# Patient Record
Sex: MALE | Race: BLACK OR AFRICAN AMERICAN | NOT HISPANIC OR LATINO | Employment: FULL TIME | URBAN - METROPOLITAN AREA
[De-identification: names, ages, dates, MRNs, and addresses within clinical notes are randomized per-mention and may not be internally consistent; named-entity substitution may affect disease eponyms.]

---

## 2022-02-08 ENCOUNTER — OFFICE VISIT (OUTPATIENT)
Dept: FAMILY MEDICINE CLINIC | Facility: CLINIC | Age: 42
End: 2022-02-08

## 2022-02-08 ENCOUNTER — LAB (OUTPATIENT)
Dept: LAB | Facility: HOSPITAL | Age: 42
End: 2022-02-08

## 2022-02-08 VITALS
WEIGHT: 195 LBS | TEMPERATURE: 97.8 F | OXYGEN SATURATION: 96 % | HEIGHT: 70 IN | DIASTOLIC BLOOD PRESSURE: 82 MMHG | SYSTOLIC BLOOD PRESSURE: 130 MMHG | BODY MASS INDEX: 27.92 KG/M2 | HEART RATE: 75 BPM

## 2022-02-08 DIAGNOSIS — Z13.0 SCREENING FOR DEFICIENCY ANEMIA: ICD-10-CM

## 2022-02-08 DIAGNOSIS — E11.65 UNCONTROLLED TYPE 2 DIABETES MELLITUS WITH HYPERGLYCEMIA: ICD-10-CM

## 2022-02-08 DIAGNOSIS — Z11.59 ENCOUNTER FOR HEPATITIS C SCREENING TEST FOR LOW RISK PATIENT: ICD-10-CM

## 2022-02-08 DIAGNOSIS — Z13.29 SCREENING FOR THYROID DISORDER: ICD-10-CM

## 2022-02-08 DIAGNOSIS — Z76.89 ENCOUNTER TO ESTABLISH CARE: Primary | ICD-10-CM

## 2022-02-08 DIAGNOSIS — Z13.1 SCREENING FOR DIABETES MELLITUS: ICD-10-CM

## 2022-02-08 DIAGNOSIS — E55.9 VITAMIN D DEFICIENCY: ICD-10-CM

## 2022-02-08 DIAGNOSIS — Z13.220 SCREENING FOR CHOLESTEROL LEVEL: ICD-10-CM

## 2022-02-08 LAB
ALBUMIN SERPL-MCNC: 4.5 G/DL (ref 3.5–5.2)
ALBUMIN/GLOB SERPL: 1.7 G/DL
ALP SERPL-CCNC: 130 U/L (ref 39–117)
ALT SERPL W P-5'-P-CCNC: 11 U/L (ref 1–41)
ANION GAP SERPL CALCULATED.3IONS-SCNC: 9.2 MMOL/L (ref 5–15)
AST SERPL-CCNC: 8 U/L (ref 1–40)
BASOPHILS # BLD AUTO: 0.03 10*3/MM3 (ref 0–0.2)
BASOPHILS NFR BLD AUTO: 0.6 % (ref 0–1.5)
BILIRUB BLD-MCNC: NEGATIVE MG/DL
BILIRUB SERPL-MCNC: 0.2 MG/DL (ref 0–1.2)
BUN SERPL-MCNC: 12 MG/DL (ref 6–20)
BUN/CREAT SERPL: 13 (ref 7–25)
CALCIUM SPEC-SCNC: 10 MG/DL (ref 8.6–10.5)
CHLORIDE SERPL-SCNC: 101 MMOL/L (ref 98–107)
CHOLEST SERPL-MCNC: 250 MG/DL (ref 0–200)
CLARITY, POC: CLEAR
CO2 SERPL-SCNC: 25.8 MMOL/L (ref 22–29)
COLOR UR: YELLOW
CREAT SERPL-MCNC: 0.92 MG/DL (ref 0.76–1.27)
DEPRECATED RDW RBC AUTO: 39.4 FL (ref 37–54)
EOSINOPHIL # BLD AUTO: 0.24 10*3/MM3 (ref 0–0.4)
EOSINOPHIL NFR BLD AUTO: 4.8 % (ref 0.3–6.2)
ERYTHROCYTE [DISTWIDTH] IN BLOOD BY AUTOMATED COUNT: 13.6 % (ref 12.3–15.4)
EXPIRATION DATE: ABNORMAL
EXPIRATION DATE: NORMAL
GFR SERPL CREATININE-BSD FRML MDRD: 109 ML/MIN/1.73
GLOBULIN UR ELPH-MCNC: 2.7 GM/DL
GLUCOSE SERPL-MCNC: 331 MG/DL (ref 65–99)
GLUCOSE UR STRIP-MCNC: ABNORMAL MG/DL
HBA1C MFR BLD: 12.7 %
HCT VFR BLD AUTO: 43.5 % (ref 37.5–51)
HDLC SERPL-MCNC: 42 MG/DL (ref 40–60)
HGB BLD-MCNC: 14.1 G/DL (ref 13–17.7)
IMM GRANULOCYTES # BLD AUTO: 0.01 10*3/MM3 (ref 0–0.05)
IMM GRANULOCYTES NFR BLD AUTO: 0.2 % (ref 0–0.5)
KETONES UR QL: NEGATIVE
LDLC SERPL CALC-MCNC: 185 MG/DL (ref 0–100)
LDLC/HDLC SERPL: 4.36 {RATIO}
LEUKOCYTE EST, POC: NEGATIVE
LYMPHOCYTES # BLD AUTO: 1.86 10*3/MM3 (ref 0.7–3.1)
LYMPHOCYTES NFR BLD AUTO: 37.1 % (ref 19.6–45.3)
Lab: ABNORMAL
Lab: NORMAL
MCH RBC QN AUTO: 25.8 PG (ref 26.6–33)
MCHC RBC AUTO-ENTMCNC: 32.4 G/DL (ref 31.5–35.7)
MCV RBC AUTO: 79.7 FL (ref 79–97)
MONOCYTES # BLD AUTO: 0.46 10*3/MM3 (ref 0.1–0.9)
MONOCYTES NFR BLD AUTO: 9.2 % (ref 5–12)
NEUTROPHILS NFR BLD AUTO: 2.41 10*3/MM3 (ref 1.7–7)
NEUTROPHILS NFR BLD AUTO: 48.1 % (ref 42.7–76)
NITRITE UR-MCNC: NEGATIVE MG/ML
NRBC BLD AUTO-RTO: 0 /100 WBC (ref 0–0.2)
PH UR: 6 [PH] (ref 5–8)
PLATELET # BLD AUTO: 269 10*3/MM3 (ref 140–450)
PMV BLD AUTO: 10.4 FL (ref 6–12)
POTASSIUM SERPL-SCNC: 4.6 MMOL/L (ref 3.5–5.2)
PROT SERPL-MCNC: 7.2 G/DL (ref 6–8.5)
PROT UR STRIP-MCNC: ABNORMAL MG/DL
RBC # BLD AUTO: 5.46 10*6/MM3 (ref 4.14–5.8)
RBC # UR STRIP: NEGATIVE /UL
SODIUM SERPL-SCNC: 136 MMOL/L (ref 136–145)
SP GR UR: 1.02 (ref 1–1.03)
TRIGL SERPL-MCNC: 125 MG/DL (ref 0–150)
UROBILINOGEN UR QL: NORMAL
VLDLC SERPL-MCNC: 23 MG/DL (ref 5–40)
WBC NRBC COR # BLD: 5.01 10*3/MM3 (ref 3.4–10.8)

## 2022-02-08 PROCEDURE — 99204 OFFICE O/P NEW MOD 45 MIN: CPT | Performed by: PHYSICIAN ASSISTANT

## 2022-02-08 PROCEDURE — 36415 COLL VENOUS BLD VENIPUNCTURE: CPT

## 2022-02-08 PROCEDURE — 86341 ISLET CELL ANTIBODY: CPT

## 2022-02-08 PROCEDURE — 83036 HEMOGLOBIN GLYCOSYLATED A1C: CPT | Performed by: PHYSICIAN ASSISTANT

## 2022-02-08 PROCEDURE — 80053 COMPREHEN METABOLIC PANEL: CPT

## 2022-02-08 PROCEDURE — 84443 ASSAY THYROID STIM HORMONE: CPT

## 2022-02-08 PROCEDURE — 85025 COMPLETE CBC W/AUTO DIFF WBC: CPT

## 2022-02-08 PROCEDURE — 86337 INSULIN ANTIBODIES: CPT

## 2022-02-08 PROCEDURE — 82306 VITAMIN D 25 HYDROXY: CPT

## 2022-02-08 PROCEDURE — 84681 ASSAY OF C-PEPTIDE: CPT

## 2022-02-08 PROCEDURE — 3046F HEMOGLOBIN A1C LEVEL >9.0%: CPT | Performed by: PHYSICIAN ASSISTANT

## 2022-02-08 PROCEDURE — 86803 HEPATITIS C AB TEST: CPT

## 2022-02-08 PROCEDURE — 80061 LIPID PANEL: CPT

## 2022-02-08 RX ORDER — METFORMIN HYDROCHLORIDE 500 MG/1
TABLET, EXTENDED RELEASE ORAL
Qty: 180 TABLET | Refills: 0 | Status: SHIPPED | OUTPATIENT
Start: 2022-02-08 | End: 2022-03-03 | Stop reason: SDUPTHER

## 2022-02-08 RX ORDER — LANCETS 30 GAUGE
EACH MISCELLANEOUS
Qty: 100 EACH | Refills: 11 | Status: SHIPPED | OUTPATIENT
Start: 2022-02-08

## 2022-02-08 RX ORDER — BLOOD-GLUCOSE METER
1 KIT MISCELLANEOUS DAILY
Qty: 1 EACH | Refills: 0 | Status: SHIPPED | OUTPATIENT
Start: 2022-02-08 | End: 2023-02-10 | Stop reason: SDUPTHER

## 2022-02-08 NOTE — PATIENT INSTRUCTIONS
Notions de base pau le diabète  Diabetes Mellitus Basics    Le diabète est une maladie à long terme (chronique). Il se produit lorsque le corps n’utilise pas correctement le sucre (glucose) hernandez est libéré par la nourriture après avoir mangé.  Le diabète peut être causé par l’un leida problèmes suivants ou les deux :  · Votre pancréas produit une quantité insuffisante d’une hormone appelée insuline.  · Votre corps ne réagit pas normalement à l’insuline qu’il fabrique.  L’insuline permet au glucose de pénétrer dans les cellules du corps. Margie vous donne de l’énergie. Si vous êtes diabétique, le glucose ne peut pas pénétrer dans les cellules. Margie cause une glycémie élevée (hyperglycémie).  Comment traiter et prendre en charge le diabète  Vous devrez peut-être prendre quotidiennement de l’insuline ou d’autres médicaments pour le diabète afin de maintenir l’équilibre du glucose. Si on vous prescrit de l’insuline, vous apprendrez comment vous administrer de l’insuline par injection. Vous devrez peut-être ajuster la quantité d’insuline que vous prenez en fonction leida aliments que vous mangez.  Vous devrez vérifier votre glycémie à l’aide d’un glucomètre selon les indications de votre prestataire de soins de santé. Les mesures permettent de déterminer si la glycémie est basse ou élevée.  Généralement, votre glycémie devrait se situer dans jacy plages de valeurs :  · Helena les repas (glycémie préprandiale) : 80 à 130 mg/dl (4,4 à 7,2 mmol/l).  · Après les repas (glycémie postprandiale) : moins de 180 mg/dl (10,0 mmol/l).  · Taux d’hémoglobine A1c (HbA1c) : moins de 7 %.  Votre prestataire de soins de santé établira leida objectifs de traitement hernandez vous conviennent.  Présentez-vous à tous les rendez-vous de suivi. C’est important.  Suivez jacy instructions à la chelo :  Médicament contre le diabète  Prenez votre antidiabétique selon les indications de votre prestataire de soins de santé. Inscrivez trveor médicaments contre le  diabète ici :  · Nom du médicament : ______________________________  ? Quantité (dose) : _______________ Heure (hollie/soir) : _______________ Remarques : ___________________________________  · Nom du médicament : ______________________________  ? Quantité (dose) : _______________ Heure (hollie/soir) : _______________ Remarques : ___________________________________  · Nom du médicament : ______________________________  ? Quantité (dose) : _______________ Heure (hollie/soir) : _______________ Remarques : ___________________________________  Insuline  Si vous prenez de l’insuline, inscrivez ici les types d’insuline prises :  · Type d’insuline : ______________________________  ? Quantité (dose) : _______________ Heure (hollie/soir) : _______________ Remarques : ___________________________________  · Type d’insuline : ______________________________  ? Quantité (dose) : _______________ Heure (hollie/soir) : _______________ Remarques : ___________________________________  · Type d’insuline : ______________________________  ? Quantité (dose) : _______________ Heure (hollie/soir) : _______________ Remarques : ___________________________________  · Type d’insuline : ______________________________  ? Quantité (dose) : _______________ Heure (hollie/soir) : _______________ Remarques : ___________________________________  · Type d’insuline : ______________________________  ? Quantité (dose) : _______________ Heure (hollie/soir) : _______________ Remarques : ___________________________________  Prise en charge de la glycémie    Surveillez votre glycémie à l’aide d’un glucomètre selon les directives de votre prestataire de soins de santé.  Notez ici les heures auxquelles vous vérifiez la glycémie :  · Heure : _______________ Remarques : ___________________________________  · Heure : _______________ Remarques : ___________________________________  · Heure : _______________ Remarques : ___________________________________  · Heure :  _______________ Remarques : ___________________________________  · Heure : _______________ Remarques : ___________________________________  · Heure : _______________ Remarques : ___________________________________    Glycémie faible  Une glycémie faible (hypoglycémie) se produit lorsque le taux de glucose sanguin est inférieur ou égal à 70 mg/dl (3,9 mmol/l). Les symptômes comprennent notamment :  · Sensation :  ? De faim.  ? De sueur et de peau moite.  ? Irritable ou facilement contrarié.  ? Étourdissement.  ? Somnolence.  · Le fait d’avoir :  ? Rythme cardiaque rapide.  ? Mal de tête.  ? Altération de la efraín.  ? Un engourdissement de la bouche, leida lèvres ou de la langue.  · Avoir leida problèmes de :  ? Mouvement (coordination).  ? Sommeil.  Traitement de l’hypoglycémie  Pour traiter une hypoglycémie, mangez ou buvez immédiatement quelque chose hernandez contient du sucre. Si vous pouvez penser clairement et pouvez avaler sans danger, suivez la règle 15:15 :  · Prenez 15 grammes de glucides à action rapide, selon les indications de votre prestataire de soins de santé.  · Voici certains glucides à action rapide :  ? Comprimés de glucose : prendre 3 ou 4 comprimés.  ? Bonbons durs : prenez 3 à 5 bonbons.  ? Jus de fruit : buvez 120 ml (4 oz).  ? Boisson gazeuse ordinaire (non diète) : buvez 120 à 180 ml (4 à 6 oz).  ? Miel ou sucre : prenez 1 c. à table (15 ml).  · Vérifiez votre glycémie 15 minutes après avoir consommé les glucides.  · Prenez de nouveau 15 g de glucide si votre glycémie est toujours de 70 mg/dl (3,9 mmol/l) ou moins.  · Si votre glycémie ne dépasse pas 70 mg/dl (3,9 mmol/l) après 3 essais, demandez de l’aide immédiatement.  · Prenez un repas ou une collation au cours de l’heure suivant le retour à la normale de la glycémie.  Traitement d’une glycémie très faible  Si votre glycémie est égale ou inférieure à 54 mg/dl (3 mmol/l), cette valeur est très faible (hypoglycémie grave).  Il s’agit d’une urgence.  N’attendez pas que les symptômes disparaissent. Consultez un médecin immédiatement. Appelez votre service d’urgence local (911 [place] Évitez de conduire pour vous rendre à l’hôpital.  Questions à poser à votre prestataire de soins de santé  · Devrais-je parler à un spécialiste du diabète?  · De quel équipement aurai-je besoin pour me soigner à la chelo?  · De quels antidiabétiques ai-je besoin? Quand devrais-je les prendre?  · À quelle fréquence dois-je vérifier ma glycémie?  · Quel numéro puis-je appeler si j’ai leida questions?  · Quand aura lieu ma visite de suivi?  · Où puis-je trouver un groupe de soutien pour les diabétiques?  Où trouver plus de renseignements  · American Diabetes Association (Association américaine du diabète) : www.diabetes.org  · Association of Diabetes Care Education Specialists (Association leida spécialistes en soins du diabète et en éducation) : www.diabeteseducator.org  Communiquez avec un prestataire de soins de santé si :  · Votre glycémie est supérieure ou égale à 240 mg/dl (13,3 mmol/l) pendant 2 jours d’affilée.  · Vous êtes malade ou vous faites de la fièvre depuis 2 jours ou plus et votre état ne s’améliore pas.  · Vous présentez l’un leida problèmes suivants depuis plus de 6 heures :  ? Vous ne parvenez ni à boire ni à manger.  ? Vous ressentez leida nausées.  ? Vous vomissez.  ? Vous avez de la diarrhée.  Obtenez de l’aide immédiatement si :  · Votre glycémie est inférieure à 54 mg/dl (3,0 mmol/l).  · Vous devenez confus.  · Vous avec de la difficulté à penser clairement.  · Vous avez de la difficulté à respirer.  Kofi symptômes peuvent indiquer un grave problème nécessitant leida soins d’urgence. N’attendez pas que les symptômes disparaissent. Consultez un médecin immédiatement. Appelez votre service d’urgence local (911 [place]). Évitez de conduire pour vous rendre à l’hôpital.  Résumé  · Le diabète est une maladie chronique hernandez se manifeste lorsque le corps n’utilise  pas correctement le sucre (glucose) hernandez est libéré par la nourriture après avoir mangé.  · Prenez votre insuline et trevor médicaments contre le diabète comme indiqué.  · Vérifiez votre glycémie chaque jour, à la fréquence indiquée.  · Présentez-vous à tous les rendez-vous de suivi. C’est important.  Cette information n’est pas destinée à remplacer les conseils prodigués par votre prestataire de soins de santé. Assurez-vous de discuter avec ce dernier de toute question que vous pouvez avoir.  Document Revised: 06/16/2021 Document Reviewed: 06/16/2021  Elsevier Patient Education © 2021 [employer]    Plan d’action pour le diabète  Diabetes Mellitus Action Plan  Suivre un plan d’action contre le diabète est le moyen pour vous de prendre en charge les symptômes de diabète. Le plan est organisé par couleur pour vous aider à comprendre les mesures que vous devez prendre en fonction leida symptômes.  · Si trevor symptômes se trouvent dans la zone rouge, vous devez alors immédiatement consulter un médecin.  · Si trevor symptômes se trouvent dans la zone jaune, vous avez alors leida problèmes.  · Si trevor symptômes se trouvent dans la zone verte, vous allez lazarus.  Apprendre ce qu’est le diabète et le comprendre peut prendre du temps. Respectez le plan que vous élaborez avec votre prestataire de soins de santé. Connaissez la plage cible pour votre glycémie (taux de glucose) et examinez votre plan de traitement avec votre prestataire de soins de santé à chaque visite.  La plage cible pour ma glycémie est de __________________________ mg/dl.  Zone rouge  Demandez immédiatement de l’aide médicale si vous présentez l’un leida symptômes suivants :  · Un résultat du test de glycémie inférieur à 54 mg/dl (3 mmol/l).  · Un résultat du test de glycémie égal ou supérieur à 240 mg/dl (13,3 mmol/l) pendant deux jours consécutifs.  · De la confusion ou de la difficulté à penser clairement.  · Une difficulté à respirer.  · Maladie ou fièvre pendant deux  jours ou plus hernandez ne s’améliore pas.  · Taux de corps cétoniques dans l’urine de moyen à élevé.  · Se sentir fatigué ou ne pas avoir d’énergie.  Si vous avez un quelconque symptôme en zone rouge, n’attendez pas de voir si les symptômes disparaissent. Consultez un médecin immédiatement. Appelez votre service d’urgence local (911 [place] Évitez de conduire pour vous rendre à l’hôpital.  Si vous avez une glycémie très basse (hypoglycémie grave) et que vous ne parvenez pas à manger ou boire, vous pourriez avoir besoin de glucagon. Assurez-vous qu’un membre de la famille ou un ami proche sache comment vérifier votre glycémie et comment vous jose du glucagon. Vous pourriez devoir [facility].  Zone orange  Si vous présentez l’un leida symptômes suivants, votre diabète n’est pas maîtrisé et vous pourriez devoir apporter certains changements :  · Un résultat du test de glycémie égal ou supérieur à 240 mg/dl (13,3 mmol/l) pendant deux jours consécutifs.  · Résultats du test de glycémie inférieur à 70 mg/dl (3,9 mmol/l).  · Autres symptômes d’hypoglycémie, notamment :  ? Tremblements ou étourdissements.  ? De la confusion ou de l’irritabilité.  ? Avoir faim.  ? Avoir un rythme cardiaque rapide.  Si vous avez un quelconque symptôme en zone jaune :  · Traitez votre hypoglycémie en mangeant ou en buvant 15 grammes de glucides à action rapide. Suivez la règle 15:15 :  ? Prenez 15 g de glucides à action rapide, notamment :  § 1 tube de gel de glucose.  § 4 comprimés de glucose.  § 120 ml (4 oz) de jus de fruit.  § 120 ml (4 oz) de boisson gazeuse régulière (non diète).  ? Vérifiez votre glycémie 15 minutes après avoir consommé les glucides.  ? Prenez de nouveau 15 g de glucides si votre test de glycémie est toujours de 70 mg/dl (3,9 mmol/l) ou moins.  ? Si votre glycémie ne monte pas au-dessus de 70 mg/dl (3,9 mmol/l) après 3 essais, demandez de l’aide médicale immédiatement.  ? Prenez un  repas ou une collation au cours de l’heure suivant le retour à la normale de votre glycémie.  · Continuez à prendre trevor médicaments quotidiens conformément aux instructions de votre prestataire de soins de santé.  · Vérifiez votre glycémie plus souvent qu’à l’habitude.  ? Écrivez trevor résultats.  ? Communiquez avec votre prestataire de soins de santé si vous avez de la difficulté à maintenir votre glycémie dans la plage cible.    Zone verte  Kofi signes signifient que vous vous portez lazarus et que vous pouvez continuer à faire ce que vous faites pour prendre en charge votre diabète :  · Votre glycémie se situe dans votre plage cible personnelle. Pour la plupart leida gens, une glycémie malia un repas (préprandiale) devrait être de 80 à 130 mg/dl (4,4 à 7,2 mmol/l).  · Vous vous sentez lazarus et vous êtes capable de faire trevor activités quotidiennes.  Si vous êtes dans la zone verte, continuez à prendre en charge votre diabète selon les indications de votre prestataire de soins de santé. Pour ce faire :  · Adoptez une alimentation saine.  · Faites de l’exercice régulièrement.  · Vérifiez votre glycémie selon les indications de votre prestataire de soins de santé.  · Prenez trevor médicaments selon les directives de votre prestataire de soins de santé.    Où trouver plus de renseignements  · American Diabetes Association (Association américaine du diabète) (ADA) : diabetes.org  · Association of Diabetes Care & Education Specialists (Association leida spécialistes en soins du diabète et en éducation) (ADCES) : diabeteseducator.org  Résumé  · Suivre un plan d’action contre le diabète est le moyen pour vous de prendre en charge les symptômes de diabète. Le plan est organisé par couleur pour vous aider à comprendre les mesures que vous devez prendre en fonction leida symptômes.  · Respectez le plan que vous élaborez avec votre prestataire de soins de santé. Assurez-vous de connaître votre glycémie cible personnelle.  · Examinez votre  plan de traitement lors de chaque visite chez [facility].  Cette information n’est pas destinée à remplacer les conseils prodigués par votre prestataire de soins de santé. Assurez-vous de discuter avec ce dernier de toute question que vous pouvez avoir.  Document Revised: 07/15/2021 Document Reviewed: 07/15/2021  Elsevier Patient Education © 2021 Elsevier Inc.

## 2022-02-08 NOTE — PROGRESS NOTES
Chief Complaint   Patient presents with   • Establish Care     Pt. states he was once told about possible sugar diabetes   • Hypertension       HPI     Vj Zapata is a 41 y.o. male who presents to establish care.  Patient has not been to a primary care provider in years.  Has not been on medication in a long time.  He reports being diagnosed with diabetes and hypertension previously.  Never on insulin.  Diagnosed with diabetes when he was 37.  Was recently seen by ophthalmologist and told to see someone regarding his sugars.  He reports blurred vision, thirst and urinary frequency.  Patient reports burning and pain in his feet.  He attributes this to standing for 12+ hours at work.    Chief Complaint   Patient presents with   • Establish Care     Pt. states he was once told about possible sugar diabetes   • Hypertension       Past Medical History:   Diagnosis Date   • Diabetes mellitus (HCC)    • Heart rate fast    • Muscle pain    • Visual impairment        History reviewed. No pertinent surgical history.    History reviewed. No pertinent family history.    Social History     Socioeconomic History   • Marital status:    Tobacco Use   • Smoking status: Never Smoker   • Smokeless tobacco: Never Used   Vaping Use   • Vaping Use: Never used   Substance and Sexual Activity   • Alcohol use: Never   • Drug use: Never   • Sexual activity: Yes     Partners: Female       No Known Allergies    ROS    Review of Systems   Constitutional: Positive for fatigue. Negative for chills and fever.   Eyes: Positive for blurred vision and visual disturbance.   Respiratory: Negative for cough, shortness of breath and wheezing.    Cardiovascular: Negative for chest pain, palpitations and leg swelling.   Endocrine: Positive for polydipsia, polyphagia and polyuria.   Musculoskeletal: Positive for myalgias.   Neurological: Positive for dizziness. Negative for headache.       Vitals:    02/08/22 1215   BP: 130/82   BP Location: Left  "arm   Patient Position: Sitting   Cuff Size: Adult   Pulse: 75   Temp: 97.8 °F (36.6 °C)   SpO2: 96%   Weight: 88.5 kg (195 lb)   Height: 177.8 cm (70\")   PainSc:   6     Body mass index is 27.98 kg/m².    No current outpatient medications on file prior to visit.     No current facility-administered medications on file prior to visit.       Results for orders placed or performed in visit on 02/08/22   POC Glycosylated Hemoglobin (Hb A1C)    Specimen: Blood   Result Value Ref Range    Hemoglobin A1C 12.7 %    Lot Number [id number]     Expiration Date 09/28/23    POC Urinalysis Dipstick, Automated    Specimen: Urine   Result Value Ref Range    Color Yellow Yellow, Straw, Dark Yellow, Carleen    Clarity, UA Clear Clear    Specific Gravity  1.020 1.005 - 1.030    pH, Urine 6.0 5.0 - 8.0    Leukocytes Negative Negative    Nitrite, UA Negative Negative    Protein, POC Trace (A) Negative mg/dL    Glucose, UA 3+ (A) Negative, 1000 mg/dL (3+) mg/dL    Ketones, UA Negative Negative    Urobilinogen, UA Normal Normal    Bilirubin Negative Negative    Blood, UA Negative Negative    Lot Number [id number]     Expiration Date 07/25/23        PE  Physical Exam  Vitals reviewed.   Constitutional:       General: He is not in acute distress.     Appearance: Normal appearance. He is well-developed and overweight. He is not ill-appearing or diaphoretic.   HENT:      Head: Normocephalic and atraumatic.   Eyes:      Extraocular Movements: Extraocular movements intact.      Conjunctiva/sclera: Conjunctivae normal.   Cardiovascular:      Rate and Rhythm: Normal rate and regular rhythm.      Heart sounds: Normal heart sounds.   Pulmonary:      Effort: Pulmonary effort is normal.      Breath sounds: Normal breath sounds.   Musculoskeletal:         General: Normal range of motion.      Cervical back: Normal range of motion.      Right lower leg: No edema.      Left lower leg: No edema.   Skin:     General: Skin is warm.      Findings: No " erythema or rash.   Neurological:      General: No focal deficit present.      Mental Status: He is alert.   Psychiatric:         Attention and Perception: He is attentive.         Mood and Affect: Mood normal.         Speech: Speech normal.         Behavior: Behavior normal. Behavior is cooperative.         Thought Content: Thought content normal.         Judgment: Judgment normal.         A/P    Diagnoses and all orders for this visit:    1. Encounter to establish care (Primary)    2. Uncontrolled type 2 diabetes mellitus with hyperglycemia (HCC)  -     POC Glycosylated Hemoglobin (Hb A1C)  -     Insulin Glargine (LANTUS SOLOSTAR) 100 UNIT/ML injection pen; Inject 10 Units under the skin into the appropriate area as directed Every Night for 30 days.  Dispense: 1 pen; Refill: 0  -     metFORMIN ER (GLUCOPHAGE-XR) 500 MG 24 hr tablet; Take 1 tablet with dinner for 7 days, then increase to 1 tab in AM with breakfast and 1 tab in PM with dinner.  Dispense: 180 tablet; Refill: 0  -     C-Peptide; Future  -     Glutamic Acid Decarboxylase; Future  -     Insulin Antibody; Future  -     empagliflozin (JARDIANCE) 25 MG tablet tablet; Take 1 tablet by mouth Daily.  Dispense: 90 tablet; Refill: 0  -     glucose monitor monitoring kit; 1 each Daily.  Dispense: 1 each; Refill: 0  -     glucose blood test strip; Use to check glucose 1-2 times a day.  Dispense: 50 each; Refill: 11  -     Lancets misc; Use 1-2 daily to check blood sugar.  Dispense: 100 each; Refill: 11  -     POC Urinalysis Dipstick, Automated  -     Ambulatory Referral to Diabetic Education  Urinalysis shows glucose, no ketones.  Hemoglobin AIC is 12.7% today.  Patient is symptomatic for uncontrolled diabetes.  Discussed symptoms to watch for and when to go to ER.  Start on Lantus 10 units nightly.  Metformin 500 mg BID with meals.  Jardiance 25 mg daily.  Glucose monitoring supplies sent to pharmacy.  Diabetes education referral placed.  Several handouts in  [place] given to patient for review on diabetes and nutrition.  Explained risks of untreated diabetes.  Return in 2 weeks.    3. Screening for thyroid disorder  -     TSH Rfx On Abnormal To Free T4; Future    4. Screening for deficiency anemia  -     CBC Auto Differential; Future    5. Screening for cholesterol level  -     Lipid Panel; Future    6. Screening for diabetes mellitus  -     Comprehensive Metabolic Panel; Future    7. Encounter for hepatitis C screening test for low risk patient  -     Hepatitis C Antibody; Future    8. Vitamin D deficiency  -     Vitamin D 25 Hydroxy; Future       I spent 45 minutes caring for Vj on this date of service. This time includes time spent by me in the following activities: preparing for the visit, reviewing tests, obtaining and/or reviewing a separately obtained history, performing a medically appropriate examination and/or evaluation, counseling and educating the patient/family/caregiver, ordering medications, tests, or procedures, documenting information in the medical record, independently interpreting results and communicating that information with the patient/family/caregiver and care coordination    Plan of care reviewed with patient at the conclusion of today's visit. Education was provided regarding diagnosis, management and any prescribed or recommended OTC medications.  Patient verbalizes understanding of and agreement with management plan.    Return in about 2 weeks (around 2/22/2022) for Recheck, diabetes.     Lilliam Cervantes PA-C

## 2022-02-09 LAB
25(OH)D3 SERPL-MCNC: 6.9 NG/ML (ref 30–100)
HCV AB SER DONR QL: NORMAL

## 2022-02-10 LAB
C PEPTIDE SERPL-MCNC: 2.7 NG/ML (ref 1.1–4.4)
TSH SERPL DL<=0.005 MIU/L-ACNC: 2.58 UIU/ML (ref 0.45–4.5)

## 2022-02-12 LAB — GAD65 AB SER IA-ACNC: <5 U/ML (ref 0–5)

## 2022-02-15 DIAGNOSIS — E78.2 MIXED HYPERLIPIDEMIA: Primary | ICD-10-CM

## 2022-02-15 DIAGNOSIS — E55.9 VITAMIN D DEFICIENCY: ICD-10-CM

## 2022-02-15 RX ORDER — ROSUVASTATIN CALCIUM 20 MG/1
20 TABLET, COATED ORAL NIGHTLY
Qty: 30 TABLET | Refills: 2 | Status: SHIPPED | OUTPATIENT
Start: 2022-02-15 | End: 2022-06-30 | Stop reason: SDUPTHER

## 2022-02-17 LAB — INSULIN AB SER-ACNC: <5 UU/ML

## 2022-03-03 ENCOUNTER — OFFICE VISIT (OUTPATIENT)
Dept: FAMILY MEDICINE CLINIC | Facility: CLINIC | Age: 42
End: 2022-03-03

## 2022-03-03 VITALS
BODY MASS INDEX: 27.66 KG/M2 | WEIGHT: 193.2 LBS | HEART RATE: 71 BPM | DIASTOLIC BLOOD PRESSURE: 64 MMHG | SYSTOLIC BLOOD PRESSURE: 126 MMHG | HEIGHT: 70 IN | OXYGEN SATURATION: 99 %

## 2022-03-03 DIAGNOSIS — E11.65 UNCONTROLLED TYPE 2 DIABETES MELLITUS WITH HYPERGLYCEMIA: ICD-10-CM

## 2022-03-03 PROCEDURE — 99213 OFFICE O/P EST LOW 20 MIN: CPT | Performed by: PHYSICIAN ASSISTANT

## 2022-03-03 RX ORDER — BLOOD-GLUCOSE METER
KIT MISCELLANEOUS
COMMUNITY
Start: 2022-02-09

## 2022-03-03 RX ORDER — METFORMIN HYDROCHLORIDE 500 MG/1
TABLET, EXTENDED RELEASE ORAL
Qty: 180 TABLET | Refills: 0
Start: 2022-03-03 | End: 2022-03-14 | Stop reason: SDUPTHER

## 2022-03-03 NOTE — PATIENT INSTRUCTIONS
Hyperglycémie  Hyperglycemia  L'hyperglycémie survient lorsque le taux de sucre (glucose) dans le sang est trop élevé. Le glucose est un type de sucre. Il constitue la principale source d'énergie de l'organisme. Certaines hormones (insuline et glucagon) contrôlent le taux de glucose dans le sang. L'insuline diminue la glycémie et le glucagon l'augmente. L'hyperglycémie peut être due à un manque d'insuline dans la circulation sanguine ou au fait que l'organisme ne réagit pas normalement à l'insuline.  L'hyperglycémie survient le plus souvent chez les personnes ayant du diabète (diabète sucré), mais leon peut survenir chez leida personnes hernandez n'en ont pas. Leon peut se produire rapidement et engager le pronostic vital si leon provoque une déshydratation sévère (acidocétose diabétique ou état hyperosmolaire hyperglycémique). L'hyperglycémie sévère est une urgence médicale.  Pour la plupart leida personnes diabétiques, un taux de glycémie supérieur à 240 mg/dl est considéré comme étant une hyperglycémie.  Quelles sont les causes ?  Si vous êtes diabétique, l'hyperglycémie peut être causée par :  · La prise de médicaments hernandez augmentent la glycémie ou hernandez ont un effet pau le contrôle de votre diabète.  · Une réduction de l'activité physique.  · Un apport de nourriture plus important que prévu.  · Le fait d'être malade ou blessé(e), d'avoir une infection ou de subir une intervention chirurgicale.  · Le stress.  · Une administration insuffisante d'insuline (si vous prenez de l'insuline).  Si vous êtes diabétique, mais que vous l'ignorez, vous pourriez vous retrouver en état d'hyperglycémie.  Si vous n'êtes pas diabétique, l'hyperglycémie peut être causée par :  · La prise de certains médicaments, notamment :  ? Les stéroïdes.  ? Les bêta-bloquants.  ? L'épinéphrine.  ? Les diurétiques thiazidiques.  · Le stress.  · Le fait d'avoir une maladie grave, une infection ou de subir une intervention chirurgicale.  · Les maladies  touchant le pancréas.  Quels sont les facteurs hernandez augmentent le risque ?  L'hyperglycémie est plus susceptible de survenir chez les personnes présentant ncik facteurs de risque de diabète, comme :  · Avoir un membre de sa famille diabétique.  · Certaines affections dans lesquelles le système de lutte contre les maladies (système immunitaire) de l'organisme s'attaque à raji-même (troubles auto-immuns).  · Être obèse ou en surpoids.  · Avoir un mode de vie inactif (sédentaire).  · Avoir un diagnostic de résistance à l'insuline.  · Avoir nick antécédents de prédiabète, de diabète gestationnel ou de syndrome nick ovaires polykystiques (SOPK).  Quels sont les signes ou symptômes ?  L'hyperglycémie peut ne causer aucun symptôme. Si vous présentez nick symptômes, ceux-ci pourraient être :  · Une augmentation de la soif.  · Le besoin d'uriner plus souvent que d'habitude.  · Une sensation de faim.  · Une vik fatigue.  · Une vision trouble.  D'autres symptômes peuvent apparaître si l'hyperglycémie s'aggrave, comme :  · Une bouche sèche.  · Nick douleurs abdominales.  · Une perte d'appétit.  · Une odeur fruitée caractéristique de l'haleine.  · Une asthénie.  · Une perte de poids inexpliquée.  · Nick picotements ou nick engourdissements dans les yvonne ou les pieds.  · Nick maux de tête.  · Nick coupures ou nick ecchymoses (bleus) hernandze sont lentes à guérir.  Comment se fait le diagnostic ?  L'hyperglycémie est diagnostiquée par une analyse de sang visant à mesurer votre taux de glycémie. Cette analyse de sang est généralement réalisée alors que vous présentez nick symptômes. Votre prestataire de soins de santé pourra également effectuer un examen physique et passer en revue trevor antécédents médicaux.  Vous pourriez devoir passer d'autres examens pour déterminer la cause de votre hyperglycémie, comme :  · Un test de glycémie à jeun. Vous ne pourrez pas manger (vous devrez jeûner) pendant au moins 8 heures malia le prélèvement  sanguin.  · Un test sanguin A1c (hémoglobine A1c). Il fournit nick informations pau le contrôle de la glycémie au cours nick 2 à 3 derniers mois.  · Un test oral de tolérance au glucose (TOTG). Il permet de mesurer votre glycémie en deux temps :  ? À jeun. Il s'agit de votre glycémie de référence.  ? Deux heures après avoir bu une boisson contenant du glucose.  Comment cette affection est-leon traitée ?  Le traitement dépend de la cause de votre hyperglycémie. Le traitement pourra consister à :  · Prendre nick médicaments pour réguler trevor taux de glycémie. Si vous prenez de l'insuline ou d'autres médicaments contre le diabète, votre médicament ou sa posologie devront peut-être être ajustés.  · Nick modifications du mode de vie, comme faire davantage d'exercice physique, adopter une alimentation plus saine et perdre du poids.  · Traiter une maladie ou une infection.  · Vérifier votre glycémie plus souvent.  · Arrêter ou réduire la prise de médicaments stéroïdes.  Si votre hyperglycémie devient sévère et qu'leon entraîne une acidocétose diabétique ou un état hyperosmolaire hyperglycémique, vous devrez être hospitalisé(e), et nick solutions et de l'insuline vous seront administrées par voie intraveineuse.  Suivez les instructions suivantes à domicile :  Instructions générales  · Prenez trevor médicaments en vente franky et pau ordonnance en suivant scrupuleusement les instructions de votre prestataire de soins de santé.  · N'utilisez pas de produits contenant du tabac ou de la nicotine, tels que les cigarettes, les cigarettes électroniques et le tabac à mâcher. Si vous avez besoin d'aide pour arrêter de fumer, demandez conseil à votre prestataire de soins de santé.  · Si vous consommez de l'alcool :  ? Limitez votre consommation à :  § 1 verre par jour pour les femmes.  § 2 verres par jour pour les hommes.  ? Ayez conscience de la quantité d'alcool contenue dans votre verre. [place] un verre correspond à une bouteille de  bière (355 ml [12 onces]), à un verre de robert (148 ml [5 onces]) ou à un verre à liqueur d'alcool fort (44 ml [1,5 once]).  · Apprenez à gérer votre stress. Si vous souhaitez obtenir de l'aide à cet effet, demandez conseil à votre prestataire de soins de santé.  · Pratiquez une activité physique régulièrement, conformément aux consignes de votre prestataire de soins de santé.  · Rendez-vous à toutes trevor visites de suivi prévues par votre prestataire de soins de santé. C'est important.  Alimentation et boissons    · Maintenez un poids hill.  · Restez hydraté(e), surtout lorsque vous faites de l'exercice, que vous tombez malade ou que vous vous trouvez dans un endroit où il fait chaud pendant un long moment. Buvez leida quantités suffisantes de liquides pour garder votre urine jaune pâle.  · Respectez votre plan alimentaire. Mangez à l'heure. Ne sautez pas de repas.    Si vous êtes diabétique :    · Assurez-vous de connaître les symptômes de l'hyperglycémie.  · Respectez votre plan de gestion du diabète en suivant les instructions de votre prestataire de soins de santé. Veillez à :  ? Prendre votre insuline et trevor médicaments conformément aux instructions.  ? Respecter votre plan d'exercice.  ? Respecter votre plan alimentaire. Mangez à l'heure et ne sautez pas de repas.  ? Vérifiez votre glycémie aussi souvent que recommandé. Assurez-vous de vérifier votre glycémie malia de faire de l'exercice et après. Si vous faites de l'exercice plus longtemps ou d'une autre manière que d'habitude, vérifiez votre glycémie plus souvent.  ? Respectez le plan de traitement prévu en segun de maladie chaque fois que vous ne pouvez pas manger ou boire normalement. Élaborez ce plan au préalable, sous la supervision de votre prestataire de soins de santé.  · Informez les gens pau votre lieu de travail, à l'école ou de votre entourage de votre programme de gestion du diabète.  · Vérifiez trevor cétones urinaires lorsque vous êtes malade et comme  indiqué par votre prestataire de soins de santé.  · Portez une carte d'alerte médicale ou leida bijoux d'alerte médicale.    Prenez contact avec un prestataire de soins de santé si :  · Votre glycémie est supérieure à 240 mg/dl (13,3 mmol/l) pendant 2 jours successifs.  · Vous avez du mal à maintenir votre glycémie dans votre plage de valeurs cibles.  · Vous avez fréquemment leida épisodes d'hyperglycémie.  · Vous présentez leida signes indiquant que vous êtes malade, comme leida nausées, leida vomissements ou de la fièvre.  Demandez immédiatement de l'aide si :  · Votre glucomètre indique « élevé », même lorsque vous prenez de l'insuline.  · Vous avez leida difficultés à respirer.  · Vous constatez un changement dans votre façon de penser, de ressentir ou d'agir (état mental).  · Vous avez leida nausées ou leida vomissements hernandez ne disparaissent pas.  Kofi symptômes peuvent être le mariola d'un problème grave et constituer une situation d'urgence. N'attendez pas de voir si les symptômes disparaissent. Vous devez consulter immédiatement un médecin. Appelez les services d'urgence locaux (911 aux [place] Ne prenez pas le volant, faites-vous [facility]pagner [facility].  Résumé  · L'hyperglycémie survient lorsque le taux de sucre (glucose) dans le sang est trop élevé.  · L'hyperglycémie peut survenir chez toute personne, diabétique ou pas, et engager le pronostic vital si leon est sévère.  · L'hyperglycémie est diagnostiquée par une analyse de sang visant à mesurer votre taux de glycémie. Cette analyse de sang est généralement réalisée alors que vous présentez leida symptômes. Votre prestataire de soins de santé pourra également effectuer un examen physique et passer en revue trevor antécédents médicaux.  · Si vous êtes diabétique, respectez votre plan de gestion du diabète en suivant les instructions de votre prestataire de soins de santé.  · Contactez votre prestataire de soins de santé si vous avez du mal à maintenir votre glycémie dans  votre plage de valeurs cibles.  Kofi conseils et renseignements ne sauraient se substituer à l’lissette médical de votre prestataire de soins de santé. Par conséquent, il est primordial de parler de toutes trevor préoccupations avec votre prestataire de soins de santé.  Document Revised: 01/19/2021 Document Reviewed: 01/19/2021  Elsevier Patient Education © 2021 [employer]

## 2022-03-03 NOTE — PROGRESS NOTES
"Chief Complaint   Patient presents with   • Diabetes     2 week f/u       HPI      Vj Zapata is a 42 y.o. male who presents for Diabetes (2 week f/u).  Patient is taking Lantus 10 units nightly.  He brings in log of fasting blood glucose showing range from 130-210.  He is watching his diet and avoiding sugary beverages.  He is taking Metformin 500 mg morning and night with food and tolerating well.  He is unsure if he was given or is taking prescription Jardiance.  He is still waiting to schedule diabetes education.  He is feeling better overall.    Son is present today and translating for father.    Past Medical History:   Diagnosis Date   • Diabetes mellitus (HCC)    • Heart rate fast    • Muscle pain    • Visual impairment        No past surgical history on file.    History reviewed. No pertinent family history.    Social History     Socioeconomic History   • Marital status:    Tobacco Use   • Smoking status: Never Smoker   • Smokeless tobacco: Never Used   Vaping Use   • Vaping Use: Never used   Substance and Sexual Activity   • Alcohol use: Never   • Drug use: Never   • Sexual activity: Yes     Partners: Female       No Known Allergies    ROS    Review of Systems   Constitutional: Negative for chills, diaphoresis and fever.   Eyes: Negative for visual disturbance.   Endocrine: Negative for polydipsia, polyphagia and polyuria.   Neurological: Negative for dizziness, light-headedness and headache.       Vitals:    03/03/22 1203   BP: 126/64   Pulse: 71   SpO2: 99%   Weight: 87.6 kg (193 lb 3.2 oz)   Height: 177.8 cm (70\")   PainSc: 0-No pain     Body mass index is 27.72 kg/m².    Current Outpatient Medications on File Prior to Visit   Medication Sig Dispense Refill   • Blood Glucose Monitoring Suppl (FreeStyle Lite) w/Device kit USE UNIT TO CHECK GLUCOSE ONCE DAILY     • cholecalciferol (VITAMIN D3) 1.25 MG (75304 UT) capsule Take 1 capsule by mouth 1 (One) Time Per Week. 12 capsule 1   • " empagliflozin (JARDIANCE) 25 MG tablet tablet Take 1 tablet by mouth Daily. 90 tablet 0   • glucose blood test strip Use to check glucose 1-2 times a day. 50 each 11   • glucose monitor monitoring kit 1 each Daily. 1 each 0   • Insulin Glargine (LANTUS SOLOSTAR) 100 UNIT/ML injection pen Inject 10 Units under the skin into the appropriate area as directed Every Night for 30 days. 1 pen 0   • Lancets misc Use 1-2 daily to check blood sugar. 100 each 11   • rosuvastatin (Crestor) 20 MG tablet Take 1 tablet by mouth Every Night. 30 tablet 2   • [DISCONTINUED] metFORMIN ER (GLUCOPHAGE-XR) 500 MG 24 hr tablet Take 1 tablet with dinner for 7 days, then increase to 1 tab in AM with breakfast and 1 tab in PM with dinner. 180 tablet 0     No current facility-administered medications on file prior to visit.       Results for orders placed or performed in visit on 02/08/22   TSH Rfx On Abnormal To Free T4    Specimen: Blood   Result Value Ref Range    TSH 2.580 0.450 - 4.500 uIU/mL   C-Peptide    Specimen: Blood   Result Value Ref Range    C-Peptide 2.7 1.1 - 4.4 ng/mL   Glutamic Acid Decarboxylase    Specimen: Blood   Result Value Ref Range    JOHNATHON-65 <5.0 0.0 - 5.0 U/mL   Insulin Antibody    Specimen: Blood   Result Value Ref Range    Insulin AutoAb <5.0 uU/mL   Comprehensive Metabolic Panel    Specimen: Blood   Result Value Ref Range    Glucose 331 (H) 65 - 99 mg/dL    BUN 12 6 - 20 mg/dL    Creatinine 0.92 0.76 - 1.27 mg/dL    Sodium 136 136 - 145 mmol/L    Potassium 4.6 3.5 - 5.2 mmol/L    Chloride 101 98 - 107 mmol/L    CO2 25.8 22.0 - 29.0 mmol/L    Calcium 10.0 8.6 - 10.5 mg/dL    Total Protein 7.2 6.0 - 8.5 g/dL    Albumin 4.50 3.50 - 5.20 g/dL    ALT (SGPT) 11 1 - 41 U/L    AST (SGOT) 8 1 - 40 U/L    Alkaline Phosphatase 130 (H) 39 - 117 U/L    Total Bilirubin 0.2 0.0 - 1.2 mg/dL    eGFR  African Amer 109 >60 mL/min/1.73    Globulin 2.7 gm/dL    A/G Ratio 1.7 g/dL    BUN/Creatinine Ratio 13.0 7.0 - 25.0    Anion Gap  9.2 5.0 - 15.0 mmol/L   Lipid Panel    Specimen: Blood   Result Value Ref Range    Total Cholesterol 250 (H) 0 - 200 mg/dL    Triglycerides 125 0 - 150 mg/dL    HDL Cholesterol 42 40 - 60 mg/dL    LDL Cholesterol  185 (H) 0 - 100 mg/dL    VLDL Cholesterol 23 5 - 40 mg/dL    LDL/HDL Ratio 4.36    Hepatitis C Antibody    Specimen: Blood   Result Value Ref Range    Hepatitis C Ab Non-Reactive Non-Reactive   Vitamin D 25 Hydroxy    Specimen: Blood   Result Value Ref Range    25 Hydroxy, Vitamin D 6.9 (L) 30.0 - 100.0 ng/ml   CBC Auto Differential    Specimen: Blood   Result Value Ref Range    WBC 5.01 3.40 - 10.80 10*3/mm3    RBC 5.46 4.14 - 5.80 10*6/mm3    Hemoglobin 14.1 13.0 - 17.7 g/dL    Hematocrit 43.5 37.5 - 51.0 %    MCV 79.7 79.0 - 97.0 fL    MCH 25.8 (L) 26.6 - 33.0 pg    MCHC 32.4 31.5 - 35.7 g/dL    RDW 13.6 12.3 - 15.4 %    RDW-SD 39.4 37.0 - 54.0 fl    MPV 10.4 6.0 - 12.0 fL    Platelets 269 140 - 450 10*3/mm3    Neutrophil % 48.1 42.7 - 76.0 %    Lymphocyte % 37.1 19.6 - 45.3 %    Monocyte % 9.2 5.0 - 12.0 %    Eosinophil % 4.8 0.3 - 6.2 %    Basophil % 0.6 0.0 - 1.5 %    Immature Grans % 0.2 0.0 - 0.5 %    Neutrophils, Absolute 2.41 1.70 - 7.00 10*3/mm3    Lymphocytes, Absolute 1.86 0.70 - 3.10 10*3/mm3    Monocytes, Absolute 0.46 0.10 - 0.90 10*3/mm3    Eosinophils, Absolute 0.24 0.00 - 0.40 10*3/mm3    Basophils, Absolute 0.03 0.00 - 0.20 10*3/mm3    Immature Grans, Absolute 0.01 0.00 - 0.05 10*3/mm3    nRBC 0.0 0.0 - 0.2 /100 WBC       PE    Physical Exam  Vitals reviewed.   Constitutional:       General: He is not in acute distress.     Appearance: Normal appearance. He is well-developed and normal weight. He is not ill-appearing or diaphoretic.   HENT:      Head: Normocephalic and atraumatic.   Eyes:      Extraocular Movements: Extraocular movements intact.      Conjunctiva/sclera: Conjunctivae normal.   Pulmonary:      Effort: No respiratory distress.   Musculoskeletal:         General: Normal  range of motion.      Cervical back: Normal range of motion.   Neurological:      General: No focal deficit present.      Mental Status: He is alert.   Psychiatric:         Attention and Perception: He is attentive.         Mood and Affect: Mood normal.         Speech: Speech normal.         Behavior: Behavior normal. Behavior is cooperative.         Thought Content: Thought content normal.         Judgment: Judgment normal.          A/P    Diagnoses and all orders for this visit:    1. Uncontrolled type 2 diabetes mellitus with hyperglycemia (HCC)  -     metFORMIN ER (GLUCOPHAGE-XR) 500 MG 24 hr tablet; 1 tab in AM with breakfast and 1 tab in PM with dinner.  Dispense: 180 tablet; Refill: 0  Previous hemoglobin AIC was 12.7%.  Taking lantus 10 units nightly, increase to 12 units.  Continue to monitor and write down fasting glucose values.  Range has been between 130-210 on readings he brings in today.  Compliant on metformin 500 mg BID with meals.  Unclear if he is taking Jardiance, he will check when he gets home.  Return in 2.5 months for repeat hemoglobin AIC.       Plan of care reviewed with patient at the conclusion of today's visit. Education was provided regarding diagnosis, management and any prescribed or recommended OTC medications.  Patient verbalizes understanding of and agreement with management plan.    Return in about 3 months (around 5/30/2022) for Recheck, DM.     Lilliam Cervantes PA-C

## 2022-03-08 DIAGNOSIS — E11.65 UNCONTROLLED TYPE 2 DIABETES MELLITUS WITH HYPERGLYCEMIA: ICD-10-CM

## 2022-03-09 RX ORDER — EMPAGLIFLOZIN 25 MG/1
TABLET, FILM COATED ORAL
Qty: 90 TABLET | Refills: 0 | Status: SHIPPED | OUTPATIENT
Start: 2022-03-09 | End: 2022-03-28

## 2022-03-09 NOTE — TELEPHONE ENCOUNTER
Rx Refill Note  Requested Prescriptions     Pending Prescriptions Disp Refills   • Jardiance 25 MG tablet tablet [Pharmacy Med Name: Jardiance 25 MG Oral Tablet] 90 tablet 0     Sig: Take 1 tablet by mouth once daily      Last office visit with prescribing clinician: 3/3/2022      Next office visit with prescribing clinician: 6/3/2022            Dayanara Griffith MA  03/09/22, 08:21 EST

## 2022-03-14 DIAGNOSIS — E11.65 UNCONTROLLED TYPE 2 DIABETES MELLITUS WITH HYPERGLYCEMIA: ICD-10-CM

## 2022-03-14 RX ORDER — METFORMIN HYDROCHLORIDE 500 MG/1
TABLET, EXTENDED RELEASE ORAL
Qty: 180 TABLET | Refills: 0
Start: 2022-03-14 | End: 2022-06-30 | Stop reason: SDUPTHER

## 2022-03-14 NOTE — TELEPHONE ENCOUNTER
Rx Refill Note  Requested Prescriptions     Pending Prescriptions Disp Refills   • metFORMIN ER (GLUCOPHAGE-XR) 500 MG 24 hr tablet 180 tablet 0     Si tab in AM with breakfast and 1 tab in PM with dinner.      Last office visit with prescribing clinician: 3/3/2022      Next office visit with prescribing clinician: 6/3/2022            Irma Lira MA  22, 13:58 EDT

## 2022-03-26 DIAGNOSIS — E11.65 UNCONTROLLED TYPE 2 DIABETES MELLITUS WITH HYPERGLYCEMIA: ICD-10-CM

## 2022-03-28 ENCOUNTER — TELEPHONE (OUTPATIENT)
Dept: FAMILY MEDICINE CLINIC | Facility: CLINIC | Age: 42
End: 2022-03-28

## 2022-03-28 RX ORDER — EMPAGLIFLOZIN 25 MG/1
TABLET, FILM COATED ORAL
Qty: 90 TABLET | Refills: 0 | Status: SHIPPED | OUTPATIENT
Start: 2022-03-28 | End: 2022-03-31 | Stop reason: SDUPTHER

## 2022-03-28 NOTE — TELEPHONE ENCOUNTER
Rx Refill Note  Requested Prescriptions     Pending Prescriptions Disp Refills   • Jardiance 25 MG tablet tablet [Pharmacy Med Name: Jardiance 25 MG Oral Tablet] 90 tablet 0     Sig: Take 1 tablet by mouth once daily      Last office visit with prescribing clinician: 3/3/2022      Next office visit with prescribing clinician: 6/3/2022            Dayanara Griffith MA  03/28/22, 08:15 EDT

## 2022-03-28 NOTE — TELEPHONE ENCOUNTER
Caller: Vj Zapata    Relationship: Self    Best call back number: [phone]    What medications are you currently taking:   Current Outpatient Medications on File Prior to Visit   Medication Sig Dispense Refill   • Blood Glucose Monitoring Suppl (FreeStyle Lite) w/Device kit USE UNIT TO CHECK GLUCOSE ONCE DAILY     • cholecalciferol (VITAMIN D3) 1.25 MG (59104 UT) capsule Take 1 capsule by mouth 1 (One) Time Per Week. 12 capsule 1   • glucose blood test strip Use to check glucose 1-2 times a day. 50 each 11   • glucose monitor monitoring kit 1 each Daily. 1 each 0   • Insulin Glargine (LANTUS SOLOSTAR) 100 UNIT/ML injection pen Inject 10 Units under the skin into the appropriate area as directed Every Night for 30 days. 1 pen 0   • Jardiance 25 MG tablet tablet Take 1 tablet by mouth once daily 90 tablet 0   • Lancets misc Use 1-2 daily to check blood sugar. 100 each 11   • metFORMIN ER (GLUCOPHAGE-XR) 500 MG 24 hr tablet 1 tab in AM with breakfast and 1 tab in PM with dinner. 180 tablet 0   • rosuvastatin (Crestor) 20 MG tablet Take 1 tablet by mouth Every Night. 30 tablet 2   • [DISCONTINUED] Jardiance 25 MG tablet tablet Take 1 tablet by mouth once daily 90 tablet 0     No current facility-administered medications on file prior to visit.      Which medication are you concerned about: Jardiance 25 MG tablet tablet    Who prescribed you this medication: NAHOMI     What are your concerns: PATIENT STATED THAT HE NEEDS HIS MEDICATION ASAP, TODAY IF POSSIBLE

## 2022-03-28 NOTE — TELEPHONE ENCOUNTER
Called and spoke to pharmacy. Was told that they see patient should have 2 more bottles. Called and spoke to patient via interpretor. Patient stated he doesn't have any other bottles left. He has looked. Called pharmacy back. Stated they spoke directly to patient that the bottle stated 1 of 3 and he should have 2 of 3 and 3 of 3. Checked against inventory numbers and it matched up. Don't show overage of medication. Patient needs to contact insurance plan to request an override of medication due to lost or stolen med so that pharmacy can fill. Called patient back and informed. Voiced understanding.

## 2022-03-30 ENCOUNTER — TELEPHONE (OUTPATIENT)
Dept: FAMILY MEDICINE CLINIC | Facility: CLINIC | Age: 42
End: 2022-03-30

## 2022-03-30 DIAGNOSIS — E11.65 UNCONTROLLED TYPE 2 DIABETES MELLITUS WITH HYPERGLYCEMIA: ICD-10-CM

## 2022-06-30 ENCOUNTER — OFFICE VISIT (OUTPATIENT)
Dept: FAMILY MEDICINE CLINIC | Facility: CLINIC | Age: 42
End: 2022-06-30

## 2022-06-30 VITALS
TEMPERATURE: 97.8 F | OXYGEN SATURATION: 97 % | BODY MASS INDEX: 27.75 KG/M2 | SYSTOLIC BLOOD PRESSURE: 150 MMHG | HEIGHT: 70 IN | HEART RATE: 88 BPM | WEIGHT: 193.8 LBS | DIASTOLIC BLOOD PRESSURE: 90 MMHG

## 2022-06-30 DIAGNOSIS — E11.65 UNCONTROLLED TYPE 2 DIABETES MELLITUS WITH HYPERGLYCEMIA: Primary | ICD-10-CM

## 2022-06-30 DIAGNOSIS — M54.9 MECHANICAL BACK PAIN: ICD-10-CM

## 2022-06-30 DIAGNOSIS — R07.9 CHEST PAIN, UNSPECIFIED TYPE: ICD-10-CM

## 2022-06-30 DIAGNOSIS — E78.2 MIXED HYPERLIPIDEMIA: ICD-10-CM

## 2022-06-30 DIAGNOSIS — I10 PRIMARY HYPERTENSION: ICD-10-CM

## 2022-06-30 LAB
BILIRUB BLD-MCNC: NEGATIVE MG/DL
CLARITY, POC: ABNORMAL
COLOR UR: YELLOW
EXPIRATION DATE: ABNORMAL
EXPIRATION DATE: NORMAL
GLUCOSE UR STRIP-MCNC: NEGATIVE MG/DL
HBA1C MFR BLD: 11 %
KETONES UR QL: NEGATIVE
LEUKOCYTE EST, POC: NEGATIVE
Lab: ABNORMAL
Lab: NORMAL
NITRITE UR-MCNC: NEGATIVE MG/ML
PH UR: 6 [PH] (ref 5–8)
PROT UR STRIP-MCNC: ABNORMAL MG/DL
RBC # UR STRIP: NEGATIVE /UL
SP GR UR: 1.02 (ref 1–1.03)
UROBILINOGEN UR QL: NORMAL

## 2022-06-30 PROCEDURE — 99214 OFFICE O/P EST MOD 30 MIN: CPT | Performed by: PHYSICIAN ASSISTANT

## 2022-06-30 PROCEDURE — 83036 HEMOGLOBIN GLYCOSYLATED A1C: CPT | Performed by: PHYSICIAN ASSISTANT

## 2022-06-30 RX ORDER — METFORMIN HYDROCHLORIDE 500 MG/1
1000 TABLET, EXTENDED RELEASE ORAL 2 TIMES DAILY WITH MEALS
Qty: 360 TABLET | Refills: 0
Start: 2022-06-30 | End: 2023-02-10 | Stop reason: SDUPTHER

## 2022-06-30 RX ORDER — LISINOPRIL 10 MG/1
10 TABLET ORAL DAILY
Qty: 90 TABLET | Refills: 1 | Status: SHIPPED | OUTPATIENT
Start: 2022-06-30 | End: 2022-10-12 | Stop reason: SDUPTHER

## 2022-06-30 RX ORDER — IBUPROFEN 600 MG/1
600 TABLET ORAL EVERY 6 HOURS PRN
Qty: 60 TABLET | Refills: 0 | Status: SHIPPED | OUTPATIENT
Start: 2022-06-30 | End: 2022-07-26 | Stop reason: SDUPTHER

## 2022-06-30 RX ORDER — ROSUVASTATIN CALCIUM 20 MG/1
20 TABLET, COATED ORAL NIGHTLY
Qty: 90 TABLET | Refills: 1 | Status: SHIPPED | OUTPATIENT
Start: 2022-06-30 | End: 2022-10-12 | Stop reason: SDUPTHER

## 2022-06-30 NOTE — PROGRESS NOTES
Chief Complaint   Patient presents with   • Diabetes     Type 2 Last A1C 2/8/22  12.7   • Shortness of Breath     Pt. States that he worries a lot and when he worries he has shortness of breath and chest pain   • Chest Pain   • Back Pain     Pt. States he feels like something  in his lower back        HPI      Vj Zapata is a 42 y.o. male who presents for Diabetes (Type 2 Last A1C 2/8/22  12.7), Shortness of Breath (Pt. States that he worries a lot and when he worries he has shortness of breath and chest pain), Chest Pain, and Back Pain (Pt. States he feels like something  in his lower back )    Patient reports chest pain with left arm pain about 2 weeks ago.  He experienced shortness of breath at that time.  No symptoms today.  Blood pressure is elevated today.  He is not currently on any medications.    He has chronic low back pain worse with standing or activity.  No pain into his legs.  Not taking any medication for pain.  His job requires that he stands.    It is unclear if he is taking all of his medications.  It does seem like he is taking Jardiance.  He is taking his lantus nightly.  Currently on 13 units.  His fasting glucose has never been less than 200.  He did not bring in glucose log today.  He does report that he is checking it every morning.    Son is present today and is interpreting for father.    Past Medical History:   Diagnosis Date   • Diabetes mellitus (HCC)    • Heart rate fast    • Muscle pain    • Visual impairment        History reviewed. No pertinent surgical history.    History reviewed. No pertinent family history.    Social History     Socioeconomic History   • Marital status:    Tobacco Use   • Smoking status: Never Smoker   • Smokeless tobacco: Never Used   Vaping Use   • Vaping Use: Never used   Substance and Sexual Activity   • Alcohol use: Never   • Drug use: Never   • Sexual activity: Yes     Partners: Female       No Known Allergies    ROS    Review  "of Systems   Constitutional: Positive for fatigue. Negative for chills and fever.   Eyes: Positive for blurred vision.   Respiratory: Positive for shortness of breath. Negative for cough.    Cardiovascular: Positive for chest pain. Negative for palpitations and leg swelling.   Endocrine: Negative for polyuria.   Musculoskeletal: Positive for back pain.   Neurological: Negative for dizziness and headache.       Vitals:    06/30/22 1459   BP: 150/90   BP Location: Left arm   Patient Position: Sitting   Cuff Size: Adult   Pulse: 88   Temp: 97.8 °F (36.6 °C)   SpO2: 97%   Weight: 87.9 kg (193 lb 12.8 oz)   Height: 177.8 cm (70\")   PainSc:   6     Body mass index is 27.81 kg/m².    Current Outpatient Medications on File Prior to Visit   Medication Sig Dispense Refill   • Blood Glucose Monitoring Suppl (FreeStyle Lite) w/Device kit USE UNIT TO CHECK GLUCOSE ONCE DAILY     • cholecalciferol (VITAMIN D3) 1.25 MG (48721 UT) capsule Take 1 capsule by mouth 1 (One) Time Per Week. 12 capsule 1   • glucose blood test strip Use to check glucose 1-2 times a day. 50 each 11   • glucose monitor monitoring kit 1 each Daily. 1 each 0   • Lancets misc Use 1-2 daily to check blood sugar. 100 each 11   • [DISCONTINUED] empagliflozin (Jardiance) 25 MG tablet tablet Take 1 tablet by mouth Daily. 90 tablet 0   • [DISCONTINUED] metFORMIN ER (GLUCOPHAGE-XR) 500 MG 24 hr tablet 1 tab in AM with breakfast and 1 tab in PM with dinner. 180 tablet 0   • [DISCONTINUED] rosuvastatin (Crestor) 20 MG tablet Take 1 tablet by mouth Every Night. 30 tablet 2   • [DISCONTINUED] Insulin Glargine (LANTUS SOLOSTAR) 100 UNIT/ML injection pen Inject 10 Units under the skin into the appropriate area as directed Every Night for 30 days. 1 pen 0     No current facility-administered medications on file prior to visit.       Results for orders placed or performed in visit on 06/30/22   POC Glycosylated Hemoglobin (Hb A1C)    Specimen: Blood   Result Value Ref Range "    Hemoglobin A1C 11.0 %    Lot Number [id number]     Expiration Date 02/04/24    POCT urinalysis dipstick, automated    Specimen: Urine   Result Value Ref Range    Color Yellow Yellow, Straw, Dark Yellow, Carleen    Clarity, UA Cloudy (A) Clear    Specific Gravity  1.025 1.005 - 1.030    pH, Urine 6.0 5.0 - 8.0    Leukocytes Negative Negative    Nitrite, UA Negative Negative    Protein, POC 1+ (A) Negative mg/dL    Glucose, UA Negative Negative mg/dL    Ketones, UA Negative Negative    Urobilinogen, UA Normal Normal    Bilirubin Negative Negative    Blood, UA Negative Negative    Lot Number [id number]     Expiration Date 11/24/23        PE    Physical Exam  Vitals reviewed.   Constitutional:       General: He is not in acute distress.     Appearance: Normal appearance. He is well-developed and normal weight. He is not ill-appearing or diaphoretic.   HENT:      Head: Normocephalic and atraumatic.   Eyes:      Extraocular Movements: Extraocular movements intact.      Conjunctiva/sclera: Conjunctivae normal.   Cardiovascular:      Rate and Rhythm: Normal rate and regular rhythm.      Heart sounds: Normal heart sounds.   Pulmonary:      Effort: Pulmonary effort is normal.      Breath sounds: Normal breath sounds.   Musculoskeletal:         General: Normal range of motion.      Cervical back: Normal range of motion.      Lumbar back: No deformity or tenderness. Normal range of motion.      Right lower leg: No edema.      Left lower leg: No edema.   Skin:     General: Skin is warm.      Findings: No erythema or rash.   Neurological:      General: No focal deficit present.      Mental Status: He is alert.   Psychiatric:         Attention and Perception: He is attentive.         Mood and Affect: Mood normal.         Speech: Speech normal.         Behavior: Behavior normal. Behavior is cooperative.         Thought Content: Thought content normal.         Judgment: Judgment normal.          A/P    Diagnoses and all orders  for this visit:    1. Uncontrolled type 2 diabetes mellitus with hyperglycemia (HCC) (Primary)  -     POC Glycosylated Hemoglobin (Hb A1C)  -     POCT urinalysis dipstick, automated  -     empagliflozin (Jardiance) 25 MG tablet tablet; Take 1 tablet by mouth Daily.  Dispense: 90 tablet; Refill: 0  -     metFORMIN ER (GLUCOPHAGE-XR) 500 MG 24 hr tablet; Take 2 tablets by mouth 2 (Two) Times a Day With Meals. 1 tab in AM with breakfast and 1 tab in PM with dinner.  Dispense: 360 tablet; Refill: 0  -     Insulin Glargine (LANTUS SOLOSTAR) 100 UNIT/ML injection pen; Inject 20 Units under the skin into the appropriate area as directed Every Night for 30 days.  Dispense: 2 pen; Refill: 1  -     Ambulatory Referral to Endocrinology  Previous hemoglobin A1c is 12.7%, hemoglobin A1c today is 11%.  Communication is a barrier.  Unclear what medications patient is taking.  He did not bring any in today.  His urinalysis did not show any glucose so I assume he is not on the Jardiance.  Will resend in prescription for Jardiance.  This may not be affordable for patient.  Will increase his metformin to 1000 mg in the morning with breakfast and 1000 mg at night with dinner.  Patient is monitoring his glucose and reports that his fasting glucose is never below 200.  He did not bring in any paperwork or log.  Will increase his Lantus from 13 units to 20 units nightly.  We will start referral to endocrinology.    2. Mixed hyperlipidemia  -     rosuvastatin (Crestor) 20 MG tablet; Take 1 tablet by mouth Every Night.  Dispense: 90 tablet; Refill: 1    3. Primary hypertension  -     lisinopril (PRINIVIL,ZESTRIL) 10 MG tablet; Take 1 tablet by mouth Daily.  Dispense: 90 tablet; Refill: 1  -     Ambulatory Referral [facility] Clinic  Blood pressure is elevated today.  Will start patient on lisinopril 10 mg daily.  Return in 4 weeks.    4. Chest pain, unspecified type  -     Ambulatory Referral [facility]  Patient reports having chest  pain that radiated into his left arm about 2 weeks ago.  He experienced fatigue and shortness of breath at that time.  He has not had new symptoms since this.  He has no symptoms today.  Will refer to chest pain clinic for further evaluation and work-up.  Patient advised to go to the ER if he develops the symptoms again.  5. Mechanical back pain  -     ibuprofen (ADVIL,MOTRIN) 600 MG tablet; Take 1 tablet by mouth Every 6 (Six) Hours As Needed for Mild Pain .  Dispense: 60 tablet; Refill: 0         Plan of care reviewed with patient at the conclusion of today's visit. Education was provided regarding diagnosis, management and any prescribed or recommended OTC medications.  Patient verbalizes understanding of and agreement with management plan.    Return in about 4 weeks (around 7/28/2022) for Recheck, HTN/back pain.     Lilliam Cervantes PA-C

## 2022-07-05 ENCOUNTER — TELEPHONE (OUTPATIENT)
Dept: FAMILY MEDICINE CLINIC | Facility: CLINIC | Age: 42
End: 2022-07-05

## 2022-07-05 DIAGNOSIS — E11.65 UNCONTROLLED TYPE 2 DIABETES MELLITUS WITH HYPERGLYCEMIA: ICD-10-CM

## 2022-07-06 ENCOUNTER — PRIOR AUTHORIZATION (OUTPATIENT)
Dept: FAMILY MEDICINE CLINIC | Facility: CLINIC | Age: 42
End: 2022-07-06

## 2022-07-06 DIAGNOSIS — E11.65 UNCONTROLLED TYPE 2 DIABETES MELLITUS WITH HYPERGLYCEMIA: Primary | ICD-10-CM

## 2022-07-15 ENCOUNTER — TELEPHONE (OUTPATIENT)
Dept: FAMILY MEDICINE CLINIC | Facility: CLINIC | Age: 42
End: 2022-07-15

## 2022-07-15 ENCOUNTER — HOSPITAL ENCOUNTER (OUTPATIENT)
Dept: CARDIOLOGY | Facility: HOSPITAL | Age: 42
Discharge: HOME | End: 2022-07-15

## 2022-07-15 ENCOUNTER — OFFICE VISIT (OUTPATIENT)
Dept: CARDIOLOGY | Facility: HOSPITAL | Age: 42
End: 2022-07-15

## 2022-07-15 VITALS
BODY MASS INDEX: 27.63 KG/M2 | RESPIRATION RATE: 16 BRPM | DIASTOLIC BLOOD PRESSURE: 84 MMHG | HEART RATE: 78 BPM | HEIGHT: 70 IN | SYSTOLIC BLOOD PRESSURE: 135 MMHG | WEIGHT: 193 LBS | OXYGEN SATURATION: 98 % | TEMPERATURE: 97.2 F

## 2022-07-15 DIAGNOSIS — R07.9 CHEST PAIN, UNSPECIFIED TYPE: ICD-10-CM

## 2022-07-15 DIAGNOSIS — E11.65 UNCONTROLLED TYPE 2 DIABETES MELLITUS WITH HYPERGLYCEMIA: ICD-10-CM

## 2022-07-15 DIAGNOSIS — R07.9 CHEST PAIN, UNSPECIFIED TYPE: Primary | ICD-10-CM

## 2022-07-15 DIAGNOSIS — R06.09 DYSPNEA ON EXERTION: ICD-10-CM

## 2022-07-15 DIAGNOSIS — E78.2 MIXED HYPERLIPIDEMIA: ICD-10-CM

## 2022-07-15 DIAGNOSIS — E11.65 UNCONTROLLED TYPE 2 DIABETES MELLITUS WITH HYPERGLYCEMIA: Primary | ICD-10-CM

## 2022-07-15 LAB
QT INTERVAL: 368 MS
QTC INTERVAL: 419 MS

## 2022-07-15 PROCEDURE — 99204 OFFICE O/P NEW MOD 45 MIN: CPT | Performed by: NURSE PRACTITIONER

## 2022-07-15 PROCEDURE — 93005 ELECTROCARDIOGRAM TRACING: CPT | Performed by: NURSE PRACTITIONER

## 2022-07-15 PROCEDURE — 93010 ELECTROCARDIOGRAM REPORT: CPT | Performed by: INTERNAL MEDICINE

## 2022-07-15 RX ORDER — INSULIN DETEMIR 100 [IU]/ML
20 INJECTION, SOLUTION SUBCUTANEOUS NIGHTLY
Qty: 6 ML | Refills: 2 | Status: SHIPPED | OUTPATIENT
Start: 2022-07-15 | End: 2022-07-16

## 2022-07-15 NOTE — TELEPHONE ENCOUNTER
Please let patient know that we are having issues getting his diabetes medications approved with insurance.  I will trial another long-acting insulin, levemir.  He was referred to endocrinology and needs to schedule an appointment with their office.  [phone].  They can manage his diabetes and help with medication.  He needs to call and make this appointment.

## 2022-07-15 NOTE — TELEPHONE ENCOUNTER
insulin detemir (Levemir) 100 UNIT/ML injection.  IS THIS SUPPOSE TO BE PENS OR VIALS?    PLEASE CALL OR RESEND

## 2022-07-15 NOTE — TELEPHONE ENCOUNTER
Used language line and called pt. Informed pt of Lilliam Cervantes's message and medication changes. Pt verbalized understanding and has no further questions at this time.

## 2022-07-15 NOTE — TELEPHONE ENCOUNTER
Denied on July 6  This request has not been approved. Based on the information submitted for review, you did not meet our guideline rules for the requested drug. In order for your request to be approved, your provider would need to show that you have met the guideline rules below. The details below are written in medical language. If you have questions, please contact your provider. In some cases, the requested medication or alternatives offered may have additional approval requirements. Our guideline named INSULIN AND RELATED AGENTS requires the following rule(s) be met for approval: There is clinical rationale (such as intolerance [side effect] to an inactive ingredient [ingredient in drug not used to treat the condition]) that a preferred product cannot be used. Your doctor told us you have Diabetes Mellitus (a disorder with high blood sugars). We have information (prescription records, chart notes) showing you have been treated with Lantus Solostar. We do not have information (prescription records, chart notes) that you have a clinical rationale that a preferred product cannot be used. This is why your request is denied. Please work with your doctor to use a different medication or get us more information if it will allow us to approve this request. A written notification letter will follow with additional details.

## 2022-07-16 RX ORDER — INSULIN DETEMIR 100 [IU]/ML
20 INJECTION, SOLUTION SUBCUTANEOUS NIGHTLY
Qty: 2 PEN | Refills: 3 | Status: SHIPPED | OUTPATIENT
Start: 2022-07-16 | End: 2023-02-10 | Stop reason: SDUPTHER

## 2022-07-19 NOTE — PROGRESS NOTES
"Chief Complaint  Hypertension, Chest Pain, and Establish Care    Subjective    History of Present Illness {CC  Problem List  Visit  Diagnosis   Encounters  Notes  Medications  Labs  Result Review Imaging  Media :23}       History of Present Illness   22-year-old male presents the office today for ongoing evaluation of his chest pain. Patient has associated shortness of breath and back pain.  Patient also reports pain radiates down his left arm.  Symptoms have been going on for at least 2 to 4 weeks.  Patient has a history of type 2 diabetes with A1c February 2022 of 12.7.  History of hyperlipidemia, vitamin D deficiency.  He is a non-smoker and no family history of premature CAD.  Visit completed today with [place]  through  cart  Objective     Vital Signs:   Vitals:    07/15/22 1513 07/15/22 1514 07/15/22 1515   BP: 135/86 139/89 135/84   BP Location: Right arm Left arm Left arm   Patient Position: Sitting Standing Sitting   Cuff Size: Adult Adult Adult   Pulse: 80 84 78   Resp:   16   Temp: 97.2 °F (36.2 °C) 97.2 °F (36.2 °C) 97.2 °F (36.2 °C)   TempSrc: Temporal Temporal Temporal   SpO2: 98%  98%   Weight:   87.5 kg (193 lb)   Height:   177.8 cm (70\")     Body mass index is 27.69 kg/m².  Physical Exam  Vitals and nursing note reviewed.   Constitutional:       Appearance: Normal appearance.   HENT:      Head: Normocephalic.   Eyes:      Pupils: Pupils are equal, round, and reactive to light.   Cardiovascular:      Rate and Rhythm: Normal rate and regular rhythm.      Pulses: Normal pulses.      Heart sounds: Normal heart sounds. No murmur heard.  Pulmonary:      Effort: Pulmonary effort is normal.      Breath sounds: Normal breath sounds.   Abdominal:      General: Bowel sounds are normal.      Palpations: Abdomen is soft.   Musculoskeletal:         General: Normal range of motion.      Cervical back: Normal range of motion.      Right lower leg: No edema.      Left lower leg: No " edema.   Skin:     General: Skin is warm and dry.      Capillary Refill: Capillary refill takes less than 2 seconds.   Neurological:      Mental Status: He is alert and oriented to person, place, and time.   Psychiatric:         Mood and Affect: Mood normal.         Thought Content: Thought content normal.              Result Review  Data Reviewed:{ Labs  Result Review  Imaging  Med Tab  Media :23}     EKG today normal sinus rhythm at 78 bpm  Lab Results   Component Value Date    CHOL 250 (H) 02/08/2022    TRIG 125 02/08/2022    HDL 42 02/08/2022     (H) 02/08/2022     Lab Results   Component Value Date    WBC 5.01 02/08/2022    HGB 14.1 02/08/2022    HCT 43.5 02/08/2022    MCV 79.7 02/08/2022     02/08/2022     Lab Results   Component Value Date    GLUCOSE 331 (H) 02/08/2022    BUN 12 02/08/2022    CREATININE 0.92 02/08/2022    EGFRIFAFRI 109 02/08/2022    BCR 13.0 02/08/2022    K 4.6 02/08/2022    CO2 25.8 02/08/2022    CALCIUM 10.0 02/08/2022    ALBUMIN 4.50 02/08/2022    AST 8 02/08/2022    ALT 11 02/08/2022              Assessment and Plan {CC Problem List  Visit Diagnosis  ROS  Review (Popup)  Health Maintenance  Quality  BestPractice  Medications  SmartSets  SnapShot Encounters  Media :23}   1. Chest pain, unspecified type  herb 1  - ECG 12 Lead; Future  - Adult Stress Echo W/ Cont or Stress Agent if Necessary Per Protocol; Future    2. Dyspnea on exertion    - ECG 12 Lead; Future  - Adult Stress Echo W/ Cont or Stress Agent if Necessary Per Protocol; Future    3. Mixed hyperlipidemia  Stable on crestor   - Adult Stress Echo W/ Cont or Stress Agent if Necessary Per Protocol; Future    4. Uncontrolled type 2 diabetes mellitus with hyperglycemia (HCC)  Continue jardiance, metformin, insulin   - Adult Stress Echo W/ Cont or Stress Agent if Necessary Per Protocol; Future      I spent 28 minutes caring for Vj on this date of service. This time includes time spent by me in the  following activities:preparing for the visit, reviewing tests, obtaining and/or reviewing a separately obtained history, performing a medically appropriate examination and/or evaluation , counseling and educating the patient/family/caregiver, ordering medications, tests, or procedures, documenting information in the medical record and care coordination    Follow Up {Instructions Charge Capture  Follow-up Communications :23}   Return if symptoms worsen or fail to improve.    Patient was given instructions and counseling regarding his condition or for health maintenance advice. Please see specific information pulled into the AVS if appropriate.  Patient was instructed to call the [facility] with any questions, concerns, or worsening symptoms.

## 2022-07-21 ENCOUNTER — HOSPITAL ENCOUNTER (OUTPATIENT)
Dept: CARDIOLOGY | Facility: HOSPITAL | Age: 42
End: 2022-07-21

## 2022-07-26 ENCOUNTER — LAB (OUTPATIENT)
Dept: LAB | Facility: HOSPITAL | Age: 42
End: 2022-07-26

## 2022-07-26 ENCOUNTER — OFFICE VISIT (OUTPATIENT)
Dept: FAMILY MEDICINE CLINIC | Facility: CLINIC | Age: 42
End: 2022-07-26

## 2022-07-26 VITALS
DIASTOLIC BLOOD PRESSURE: 80 MMHG | SYSTOLIC BLOOD PRESSURE: 120 MMHG | OXYGEN SATURATION: 96 % | HEART RATE: 78 BPM | TEMPERATURE: 97.8 F | BODY MASS INDEX: 27.66 KG/M2 | HEIGHT: 70 IN | WEIGHT: 193.2 LBS

## 2022-07-26 DIAGNOSIS — E11.65 UNCONTROLLED TYPE 2 DIABETES MELLITUS WITH HYPERGLYCEMIA: ICD-10-CM

## 2022-07-26 DIAGNOSIS — I10 PRIMARY HYPERTENSION: ICD-10-CM

## 2022-07-26 DIAGNOSIS — M54.50 CHRONIC MIDLINE LOW BACK PAIN WITHOUT SCIATICA: ICD-10-CM

## 2022-07-26 DIAGNOSIS — M54.9 MECHANICAL BACK PAIN: Primary | ICD-10-CM

## 2022-07-26 DIAGNOSIS — E78.2 MIXED HYPERLIPIDEMIA: ICD-10-CM

## 2022-07-26 DIAGNOSIS — G89.29 CHRONIC MIDLINE LOW BACK PAIN WITHOUT SCIATICA: ICD-10-CM

## 2022-07-26 PROCEDURE — 99214 OFFICE O/P EST MOD 30 MIN: CPT | Performed by: PHYSICIAN ASSISTANT

## 2022-07-26 RX ORDER — IBUPROFEN 600 MG/1
600 TABLET ORAL EVERY 6 HOURS PRN
Qty: 180 TABLET | Refills: 1 | Status: SHIPPED | OUTPATIENT
Start: 2022-07-26

## 2022-07-26 NOTE — PROGRESS NOTES
"Chief Complaint   Patient presents with   • Back Pain     Pt. States his back has been hurting since last visit   • Hypertension       HPI     Vj Zapata is a pleasant 42 y.o. male who is here for  Uncontrolled diabetes type 2, hyperlipidemia, hypertension, and back pain.  Patient's last hemoglobin AIC was 11% a month ago.  Patient is compliant on medication.  Has appointment coming up with endocrinology to establish care.  His blood pressure is stable and well-controlled.  Patient reports ongoing lower back pain with no radicular pain into legs.  He reports that ibuprofen 600 mg twice a day helped with pain.  Pain is worse while working, better at rest.  Patient's son is present.  He is interpreting when needed for father.    Past Medical History:   Diagnosis Date   • Diabetes mellitus (HCC)    • Heart rate fast    • Muscle pain    • Visual impairment        History reviewed. No pertinent surgical history.    History reviewed. No pertinent family history.    Social History     Socioeconomic History   • Marital status:    Tobacco Use   • Smoking status: Never Smoker   • Smokeless tobacco: Never Used   Vaping Use   • Vaping Use: Never used   Substance and Sexual Activity   • Alcohol use: Never   • Drug use: Never   • Sexual activity: Yes     Partners: Female       No Known Allergies    ROS  Review of Systems   Constitutional: Negative for chills and fever.   Respiratory: Negative for cough, shortness of breath and wheezing.    Cardiovascular: Negative for chest pain, palpitations and leg swelling.   Musculoskeletal: Positive for back pain.   Neurological: Negative for dizziness and headache.       Vitals:    07/26/22 1454   BP: 120/80   BP Location: Left arm   Patient Position: Sitting   Cuff Size: Adult   Pulse: 78   Temp: 97.8 °F (36.6 °C)   SpO2: 96%   Weight: 87.6 kg (193 lb 3.2 oz)   Height: 177.8 cm (70\")   PainSc:   6     Body mass index is 27.72 kg/m².    Current Outpatient Medications on File " Prior to Visit   Medication Sig Dispense Refill   • Blood Glucose Monitoring Suppl (FreeStyle Lite) w/Device kit USE UNIT TO CHECK GLUCOSE ONCE DAILY     • cholecalciferol (VITAMIN D3) 1.25 MG (07717 UT) capsule Take 1 capsule by mouth 1 (One) Time Per Week. 12 capsule 1   • empagliflozin (Jardiance) 25 MG tablet tablet Take 1 tablet by mouth Daily. 90 tablet 0   • glucose blood test strip Use to check glucose 1-2 times a day. 50 each 11   • glucose monitor monitoring kit 1 each Daily. 1 each 0   • insulin detemir (Levemir FlexTouch) 100 UNIT/ML injection Inject 20 Units under the skin into the appropriate area as directed Every Night for 30 days. 2 pen 3   • Lancets misc Use 1-2 daily to check blood sugar. 100 each 11   • lisinopril (PRINIVIL,ZESTRIL) 10 MG tablet Take 1 tablet by mouth Daily. 90 tablet 1   • metFORMIN ER (GLUCOPHAGE-XR) 500 MG 24 hr tablet Take 2 tablets by mouth 2 (Two) Times a Day With Meals. 1 tab in AM with breakfast and 1 tab in PM with dinner. 360 tablet 0   • rosuvastatin (Crestor) 20 MG tablet Take 1 tablet by mouth Every Night. 90 tablet 1   • [DISCONTINUED] ibuprofen (ADVIL,MOTRIN) 600 MG tablet Take 1 tablet by mouth Every 6 (Six) Hours As Needed for Mild Pain . 60 tablet 0     No current facility-administered medications on file prior to visit.       Results for orders placed or performed during the hospital encounter of 07/15/22   ECG 12 Lead   Result Value Ref Range    QT Interval 368 ms    QTC Interval 419 ms       PE    Physical Exam  Vitals reviewed.   Constitutional:       General: He is not in acute distress.     Appearance: Normal appearance. He is well-developed and normal weight. He is not ill-appearing or diaphoretic.   HENT:      Head: Normocephalic and atraumatic.   Eyes:      Extraocular Movements: Extraocular movements intact.      Conjunctiva/sclera: Conjunctivae normal.   Cardiovascular:      Rate and Rhythm: Normal rate and regular rhythm.      Heart sounds: Normal  heart sounds.   Pulmonary:      Effort: Pulmonary effort is normal.      Breath sounds: Normal breath sounds.   Musculoskeletal:         General: Normal range of motion.      Cervical back: Normal range of motion.        Back:       Right lower leg: No edema.      Left lower leg: No edema.   Skin:     General: Skin is warm.      Findings: No erythema or rash.   Neurological:      General: No focal deficit present.      Mental Status: He is alert.   Psychiatric:         Attention and Perception: He is attentive.         Mood and Affect: Mood normal.         Speech: Speech normal.         Behavior: Behavior normal. Behavior is cooperative.         Thought Content: Thought content normal.         Judgment: Judgment normal.         A/P    Diagnoses and all orders for this visit:    1. Mechanical back pain (Primary)  -     Basic Metabolic Panel; Future  -     ibuprofen (ADVIL,MOTRIN) 600 MG tablet; Take 1 tablet by mouth Every 6 (Six) Hours As Needed for Mild Pain .  Dispense: 180 tablet; Refill: 1  -     Ambulatory Referral to Physical Therapy Evaluate and treat  Ongoing chronic low back pain.  Has done well with ibuprofen.  Recommend ibuprofen 600 mg twice daily with food and water.  Discussed obtaining back brace to wear at work.  Return in a few months.  Will check BMP at that time to ensure patient's kidneys are tolerating ibuprofen.  Call sooner if back pain worsens or changes.  Trial of PT as well.    2. Primary hypertension  Stable, well-controlled.  Compliant on medication.    3. Uncontrolled type 2 diabetes mellitus with hyperglycemia (HCC)  Hemoglobin AIC was 11% on 6/30.  Compliant on medication.  Has appointment with endocrinology to establish.    4. Mixed hyperlipidemia  On rosuvastatin 20 mg nightly.       Plan of care reviewed with patient at the conclusion of today's visit. Education was provided regarding diagnosis, management and any prescribed or recommended OTC medications.  Patient verbalizes  understanding of and agreement with management plan.    Return in about 5 months (around 1/2/2023) for Annual physical.     Lilliam Cervantes PA-C

## 2022-07-27 ENCOUNTER — HOSPITAL ENCOUNTER (OUTPATIENT)
Dept: CARDIOLOGY | Facility: HOSPITAL | Age: 42
Discharge: HOME | End: 2022-07-27
Admitting: NURSE PRACTITIONER

## 2022-07-27 VITALS
WEIGHT: 193 LBS | BODY MASS INDEX: 27.63 KG/M2 | SYSTOLIC BLOOD PRESSURE: 126 MMHG | DIASTOLIC BLOOD PRESSURE: 80 MMHG | HEIGHT: 70 IN | HEART RATE: 78 BPM

## 2022-07-27 DIAGNOSIS — E11.65 UNCONTROLLED TYPE 2 DIABETES MELLITUS WITH HYPERGLYCEMIA: ICD-10-CM

## 2022-07-27 DIAGNOSIS — R06.09 DYSPNEA ON EXERTION: ICD-10-CM

## 2022-07-27 DIAGNOSIS — R07.9 CHEST PAIN, UNSPECIFIED TYPE: ICD-10-CM

## 2022-07-27 DIAGNOSIS — E78.2 MIXED HYPERLIPIDEMIA: ICD-10-CM

## 2022-07-27 LAB
BH CV ECHO MEAS - AO ROOT DIAM: 3 CM
BH CV ECHO MEAS - EDV(CUBED): 103.8 ML
BH CV ECHO MEAS - EDV(MOD-SP2): 84.9 ML
BH CV ECHO MEAS - EDV(MOD-SP4): 104 ML
BH CV ECHO MEAS - EF(MOD-BP): 55.6 %
BH CV ECHO MEAS - EF(MOD-SP2): 54.1 %
BH CV ECHO MEAS - EF(MOD-SP4): 59.2 %
BH CV ECHO MEAS - ESV(CUBED): 24.4 ML
BH CV ECHO MEAS - ESV(MOD-SP2): 39 ML
BH CV ECHO MEAS - ESV(MOD-SP4): 42.4 ML
BH CV ECHO MEAS - FS: 38.3 %
BH CV ECHO MEAS - IVS/LVPW: 1 CM
BH CV ECHO MEAS - IVSD: 0.9 CM
BH CV ECHO MEAS - LA DIMENSION: 3.8 CM
BH CV ECHO MEAS - LV DIASTOLIC VOL/BSA (35-75): 50.6 CM2
BH CV ECHO MEAS - LV MASS(C)D: 142.7 GRAMS
BH CV ECHO MEAS - LV SYSTOLIC VOL/BSA (12-30): 20.6 CM2
BH CV ECHO MEAS - LVIDD: 4.7 CM
BH CV ECHO MEAS - LVIDS: 2.9 CM
BH CV ECHO MEAS - LVOT AREA: 3.1 CM2
BH CV ECHO MEAS - LVOT DIAM: 2 CM
BH CV ECHO MEAS - LVPWD: 0.9 CM
BH CV ECHO MEAS - SI(MOD-SP2): 22.3 ML/M2
BH CV ECHO MEAS - SI(MOD-SP4): 30 ML/M2
BH CV ECHO MEAS - SV(MOD-SP2): 45.9 ML
BH CV ECHO MEAS - SV(MOD-SP4): 61.6 ML
BH CV ECHO MEAS - TAPSE (>1.6): 1.7 CM
BH CV STRESS BP STAGE 1: NORMAL
BH CV STRESS BP STAGE 2: NORMAL
BH CV STRESS BP STAGE 3: NORMAL
BH CV STRESS DURATION MIN STAGE 1: 3
BH CV STRESS DURATION MIN STAGE 2: 3
BH CV STRESS DURATION MIN STAGE 3: 3
BH CV STRESS DURATION MIN STAGE 4: 1
BH CV STRESS DURATION SEC STAGE 1: 0
BH CV STRESS DURATION SEC STAGE 2: 0
BH CV STRESS DURATION SEC STAGE 3: 0
BH CV STRESS DURATION SEC STAGE 4: 0
BH CV STRESS ECHO POST STRESS EJECTION FRACTION EF: 75 %
BH CV STRESS GRADE STAGE 1: 10
BH CV STRESS GRADE STAGE 2: 12
BH CV STRESS GRADE STAGE 3: 14
BH CV STRESS GRADE STAGE 4: 16
BH CV STRESS HR STAGE 1: 112
BH CV STRESS HR STAGE 2: 125
BH CV STRESS HR STAGE 3: 146
BH CV STRESS HR STAGE 4: 155
BH CV STRESS METS STAGE 1: 5
BH CV STRESS METS STAGE 2: 7.5
BH CV STRESS METS STAGE 3: 10
BH CV STRESS METS STAGE 4: 13.5
BH CV STRESS O2 STAGE 1: 99
BH CV STRESS O2 STAGE 2: 98
BH CV STRESS O2 STAGE 3: 98
BH CV STRESS O2 STAGE 4: 99
BH CV STRESS PROTOCOL 1: NORMAL
BH CV STRESS RECOVERY BP: NORMAL MMHG
BH CV STRESS RECOVERY HR: 93 BPM
BH CV STRESS RECOVERY O2: 99 %
BH CV STRESS SPEED STAGE 1: 1.7
BH CV STRESS SPEED STAGE 2: 2.5
BH CV STRESS SPEED STAGE 3: 3.4
BH CV STRESS SPEED STAGE 4: 4.2
BH CV STRESS STAGE 1: 1
BH CV STRESS STAGE 2: 2
BH CV STRESS STAGE 3: 3
BH CV STRESS STAGE 4: 4
BH CV VAS BP LEFT ARM: NORMAL MMHG
BUN SERPL-MCNC: 10 MG/DL (ref 6–20)
BUN/CREAT SERPL: 11.1 (ref 7–25)
CALCIUM SERPL-MCNC: 9.4 MG/DL (ref 8.6–10.5)
CHLORIDE SERPL-SCNC: 105 MMOL/L (ref 98–107)
CO2 SERPL-SCNC: 23.3 MMOL/L (ref 22–29)
CREAT SERPL-MCNC: 0.9 MG/DL (ref 0.76–1.27)
EGFRCR SERPLBLD CKD-EPI 2021: 108.7 ML/MIN/1.73
GLUCOSE SERPL-MCNC: 191 MG/DL (ref 65–99)
MAXIMAL PREDICTED HEART RATE: 177 BPM
PERCENT MAX PREDICTED HR: 87.57 %
POTASSIUM SERPL-SCNC: 4.4 MMOL/L (ref 3.5–5.2)
SODIUM SERPL-SCNC: 139 MMOL/L (ref 136–145)
STRESS BASELINE BP: NORMAL MMHG
STRESS BASELINE HR: 78 BPM
STRESS O2 SAT REST: 98 %
STRESS PERCENT HR: 103 %
STRESS POST ESTIMATED WORKLOAD: 11.7 METS
STRESS POST EXERCISE DUR MIN: 10 MIN
STRESS POST EXERCISE DUR SEC: 0 SEC
STRESS POST O2 SAT PEAK: 99 %
STRESS POST PEAK BP: NORMAL MMHG
STRESS POST PEAK HR: 155 BPM
STRESS TARGET HR: 150 BPM

## 2022-07-27 PROCEDURE — 93350 STRESS TTE ONLY: CPT

## 2022-07-27 PROCEDURE — 93017 CV STRESS TEST TRACING ONLY: CPT

## 2022-07-27 PROCEDURE — 93018 CV STRESS TEST I&R ONLY: CPT | Performed by: INTERNAL MEDICINE

## 2022-07-27 PROCEDURE — 93350 STRESS TTE ONLY: CPT | Performed by: INTERNAL MEDICINE

## 2022-07-27 PROCEDURE — 93352 ADMIN ECG CONTRAST AGENT: CPT | Performed by: INTERNAL MEDICINE

## 2022-07-27 PROCEDURE — 25010000002 SULFUR HEXAFLUORIDE MICROSPH 60.7-25 MG RECONSTITUTED SUSPENSION: Performed by: NURSE PRACTITIONER

## 2022-07-27 RX ADMIN — SULFUR HEXAFLUORIDE 5 ML: KIT at 10:32

## 2022-09-13 ENCOUNTER — TREATMENT (OUTPATIENT)
Dept: PHYSICAL THERAPY | Facility: CLINIC | Age: 42
End: 2022-09-13

## 2022-09-13 DIAGNOSIS — G89.29 CHRONIC BILATERAL LOW BACK PAIN WITHOUT SCIATICA: Primary | ICD-10-CM

## 2022-09-13 DIAGNOSIS — M54.50 CHRONIC BILATERAL LOW BACK PAIN WITHOUT SCIATICA: Primary | ICD-10-CM

## 2022-09-13 PROCEDURE — 97162 PT EVAL MOD COMPLEX 30 MIN: CPT | Performed by: PHYSICAL THERAPIST

## 2022-09-13 NOTE — PROGRESS NOTES
Physical Therapy Initial Evaluation and Plan of Care      Patient: Vj Zapata   : 1980  Diagnosis/ICD-10 Code:  The encounter diagnosis was Chronic bilateral low back pain without sciatica.   Referring practitioner: Lilliam Cervantes, *  Date of Initial Visit: Type: THERAPY  Noted: 2022  Today's Date: 2022  Patient seen for 1 sessions         Visit Diagnoses:    ICD-10-CM ICD-9-CM   1. Chronic bilateral low back pain without sciatica  M54.50 724.2    G89.29 338.29       Subjective Questionnaire: Oswestry: 30%    Subjective Evaluation    History of Present Illness  Onset date: 5 months ago.  Mechanism of injury: Pain of 5 months duration for no known reason.  Pt has his daughter with him to interpret today.  He says that ibuprofen provides complete relief of pain.      Subjective comment: bilateral low back pain  Patient Occupation: , he reports no particular recreational interests Pain  Current pain ratin  At best pain ratin  At worst pain ratin  Location: B LBP  Relieving factors: medications  Aggravating factors: prolonged positioning (sitting, sit to stand transition)    Diagnostic Tests  No diagnostic tests performed    Treatments  Previous treatment: medication (ibuprofen)  Patient Goals  Patient goals for therapy: decreased pain           Treatment                Objective          Postural Observations  Seated posture: fair  Standing posture: fair        Neurological Testing     Sensation     Lumbar   Left   Intact: light touch    Right   Intact: light touch    Additional Neurological Details  Pt denies any distal/referred synptoms    Active Range of Motion     Additional Active Range of Motion Details  FIS: 50% due to tightness in back and legs  EIS: WNL, uncomfortable  THEODORE: tight but not painful  EIL: mild discomfort    Strength/Myotome Testing     Lumbar   Left   Normal strength  Heel walk: normal  Toe walk: normal    Right   Normal  strength  Heel walk: normal  Toe walk: normal    Tests       Thoracic   Negative slump.     Lumbar     Left   Negative passive SLR.     Right   Negative passive SLR.     Lumbar Flexibility Comments:   Decreased flexibility in B hamstrings, B calves, and spine (more limited in flexion than in extension).          Assessment & Plan     Assessment  Impairments: abnormal or restricted ROM, lacks appropriate home exercise program and pain with function  Functional Limitations: lifting and uncomfortable because of pain  Assessment details: Pt presents with bilateral low back pain without evidence of referred or radicular symptoms.  He demonstrates a loss of flexibility in back and B LE's.  He will benefit from PT intervention to address pain and noted deficits.  Prognosis: good    Goals  Plan Goals: 3 weeks  1) Pt. demonstrates independence and compliance with initial HEP.  2) Pt. reports reduction in pain intensity to no worse than 4/10 on NPRS.  3) AROM of lumbar spine and LE's shows improvement over baseline measures.    6 weeks  1) Pt. demonstrates independence in advanced HEP for ongoing improvement.  2) AROM of lumbar spine and LE's is sufficient for performance of daily activities.  3) Functional outcome measure is improved by 8% or more, indicating improving functional abilities.          Plan  Therapy options: will be seen for skilled therapy services  Planned therapy interventions: manual therapy, flexibility, functional ROM exercises, home exercise program, joint mobilization, therapeutic activities, stretching, spinal/joint mobilization and postural training  Frequency: 1x week  Duration in weeks: 6  Treatment plan discussed with: patient and family  Plan details: PT weekly for 6 visits to educate in effective HEP and self-management strategies.        Timed:  Manual Therapy:         mins  60518;  Therapeutic Exercise:         mins  58134;     Neuromuscular Rosie:        mins  73450;    Therapeutic Activity:           mins  63847;     Gait Training:           mins  31808;     Ultrasound:          mins  83969;    Electrical Stimulation:         mins  88139 ( );  Iontophoresis  ___ mins   49632    Untimed:  Electrical Stimulation:         mins  50761 ( );  Mechanical Traction:         mins  24950;     Timed Treatment:      mins   Total Treatment:     45   mins    PT SIGNATURE: Stephanie Meeks PT   Electronically signed  DATE TREATMENT INITIATED: 9/13/2022    Initial Certification  Certification Period: 9/13/[id number]/11/2022  I certify that the therapy services are furnished while this patient is under my care.  The services outlined above are required by this patient, and will be reviewed every 90 days.    Physician Signature:_____________________________________________             PHYSICIAN: Lilliam Cervantes PA-C  NPI: [id number]                                      DATE:       Please sign and return via fax to [phone].. Thank you, [facility].

## 2022-10-12 ENCOUNTER — DOCUMENTATION (OUTPATIENT)
Dept: PHYSICAL THERAPY | Facility: CLINIC | Age: 42
End: 2022-10-12

## 2022-10-12 DIAGNOSIS — E78.2 MIXED HYPERLIPIDEMIA: ICD-10-CM

## 2022-10-12 DIAGNOSIS — I10 PRIMARY HYPERTENSION: ICD-10-CM

## 2022-10-12 DIAGNOSIS — E11.65 UNCONTROLLED TYPE 2 DIABETES MELLITUS WITH HYPERGLYCEMIA: ICD-10-CM

## 2022-10-12 NOTE — TELEPHONE ENCOUNTER
Caller: Vj Zapata    Relationship: Self    Best call back number:    [phone]         Requested Prescriptions:   Requested Prescriptions     Pending Prescriptions Disp Refills   • empagliflozin (Jardiance) 25 MG tablet tablet 90 tablet 0     Sig: Take 1 tablet by mouth Daily.   • lisinopril (PRINIVIL,ZESTRIL) 10 MG tablet 90 tablet 1     Sig: Take 1 tablet by mouth Daily.   • rosuvastatin (Crestor) 20 MG tablet 90 tablet 1     Sig: Take 1 tablet by mouth Every Night.        Pharmacy where request should be sent: [address] [phone] [facility] [phone]      Additional details provided by patient:   Does the patient have less than a 3 day supply:  [x] Yes  [] No    Matilde Kowalski Rep   10/12/22 15:54 EDT

## 2022-10-12 NOTE — PROGRESS NOTES
Discharge Summary  Discharge Summary from Physical Therapy Report      Patient: Vj Zapata   : 1980  Diagnosis/ICD-10 Code:  There were no encounter diagnoses.   Referring practitioner: No ref. provider found  Date of Initial Visit: No linked episodes  Today's Date: 10/12/2022  Date of Last Visit: 2022     Number of Visits: 1    Discharge Status of Patient: Did not show for first follow up and has not called to reschedule.    Goals: unknown-did not return for treatment    Discharge Plan: Continue with current home exercise program as instructed    Date of Discharge: 10/12/2022        Stephanie Meeks, PT

## 2022-10-13 RX ORDER — LISINOPRIL 10 MG/1
10 TABLET ORAL DAILY
Qty: 90 TABLET | Refills: 1 | Status: SHIPPED | OUTPATIENT
Start: 2022-10-13

## 2022-10-13 RX ORDER — ROSUVASTATIN CALCIUM 20 MG/1
20 TABLET, COATED ORAL NIGHTLY
Qty: 90 TABLET | Refills: 1 | Status: SHIPPED | OUTPATIENT
Start: 2022-10-13 | End: 2023-01-09

## 2022-10-13 NOTE — TELEPHONE ENCOUNTER
Rx Refill Note  Requested Prescriptions     Pending Prescriptions Disp Refills   • empagliflozin (Jardiance) 25 MG tablet tablet 90 tablet 0     Sig: Take 1 tablet by mouth Daily.   • lisinopril (PRINIVIL,ZESTRIL) 10 MG tablet 90 tablet 1     Sig: Take 1 tablet by mouth Daily.   • rosuvastatin (Crestor) 20 MG tablet 90 tablet 1     Sig: Take 1 tablet by mouth Every Night.      Last office visit with prescribing clinician: 7/26/2022      Next office visit with prescribing clinician: 1/5/2023            Dayanara Griffith MA  10/13/22, 10:22 EDT

## 2022-10-21 ENCOUNTER — LAB (OUTPATIENT)
Dept: LAB | Facility: HOSPITAL | Age: 42
End: 2022-10-21

## 2022-10-21 ENCOUNTER — OFFICE VISIT (OUTPATIENT)
Dept: ENDOCRINOLOGY | Facility: CLINIC | Age: 42
End: 2022-10-21

## 2022-10-21 VITALS
OXYGEN SATURATION: 97 % | HEIGHT: 70 IN | DIASTOLIC BLOOD PRESSURE: 80 MMHG | SYSTOLIC BLOOD PRESSURE: 118 MMHG | HEART RATE: 93 BPM | WEIGHT: 198 LBS | BODY MASS INDEX: 28.35 KG/M2

## 2022-10-21 DIAGNOSIS — E11.65 TYPE II DIABETES MELLITUS WITH HYPEROSMOLARITY, UNCONTROLLED: ICD-10-CM

## 2022-10-21 DIAGNOSIS — E11.65 UNCONTROLLED TYPE 2 DIABETES MELLITUS WITH HYPERGLYCEMIA: Primary | ICD-10-CM

## 2022-10-21 DIAGNOSIS — E11.00 TYPE II DIABETES MELLITUS WITH HYPEROSMOLARITY, UNCONTROLLED: ICD-10-CM

## 2022-10-21 LAB
EXPIRATION DATE: ABNORMAL
EXPIRATION DATE: NORMAL
GLUCOSE BLDC GLUCOMTR-MCNC: 387 MG/DL (ref 70–130)
HBA1C MFR BLD: 11.3 %
Lab: ABNORMAL
Lab: NORMAL

## 2022-10-21 PROCEDURE — 99204 OFFICE O/P NEW MOD 45 MIN: CPT | Performed by: INTERNAL MEDICINE

## 2022-10-21 PROCEDURE — 3046F HEMOGLOBIN A1C LEVEL >9.0%: CPT | Performed by: INTERNAL MEDICINE

## 2022-10-21 PROCEDURE — 82947 ASSAY GLUCOSE BLOOD QUANT: CPT | Performed by: INTERNAL MEDICINE

## 2022-10-21 PROCEDURE — 83036 HEMOGLOBIN GLYCOSYLATED A1C: CPT | Performed by: INTERNAL MEDICINE

## 2022-10-21 NOTE — PROGRESS NOTES
"     Office Note      Date: 10/21/2022  Patient Name: Vj Zapata  MRN: [id number]  : 1980    Chief Complaint   Patient presents with   • Diabetes       History of Present Illness:   Vj Zapata is a 42 y.o. male who presents for Diabetes - type 2  He is seen as a new patient today   ===========  Hx is from patient and son.  Diabetes was diagnosed at age 38. He was quite symptomatic at the time.  He was treated with pills.  Basal insulin was added about a year ago  Blood sugars have remained uncontrolled  Fasting blood sugars about 140. Much higher at bedtime   He has no family with diabetes   Last A1c:  Hemoglobin A1C   Date Value Ref Range Status   10/21/2022 11.3 % Final       Subjective      Diabetic Complications:  Eyes: No  Kidneys: No  Feet: No  Heart: No    Diet and Exercise:  Meals per day: 2  Minutes of exercise per week: >150 mins.    Review of Systems:   Review of Systems   Constitutional: Positive for fatigue and unexpected weight change.   Eyes: Positive for visual disturbance.   Endocrine: Positive for polydipsia and polyuria.   All other systems reviewed and are negative.      The following portions of the patient's history were reviewed and updated as appropriate: allergies, current medications, past family history, past medical history, past social history, past surgical history and problem list.    Objective     Visit Vitals  /80   Pulse 93   Ht 177.8 cm (70\")   Wt 89.8 kg (198 lb)   SpO2 97%   BMI 28.41 kg/m²       Labs:    CMP  Lab Results   Component Value Date    GLUCOSE 191 (H) 2022    BUN 10 2022    CREATININE 0.90 2022    EGFRIFAFRI 109 2022    BCR 11.1 2022    K 4.4 2022    CO2 23.3 2022    CALCIUM 9.4 2022    AST 8 2022    ALT 11 2022        CBC w/DIFF  Lab Results   Component Value Date    WBC 5.01 2022    RBC 5.46 2022    HGB 14.1 2022    HCT 43.5 2022    MCV 79.7 2022    MCH " 25.8 (L) 02/08/2022    MCHC 32.4 02/08/2022    RDW 13.6 02/08/2022    RDWSD 39.4 02/08/2022    MPV 10.4 02/08/2022     02/08/2022    NEUTRORELPCT 48.1 02/08/2022    LYMPHORELPCT 37.1 02/08/2022    MONORELPCT 9.2 02/08/2022    EOSRELPCT 4.8 02/08/2022    BASORELPCT 0.6 02/08/2022    AUTOIGPER 0.2 02/08/2022    NEUTROABS 2.41 02/08/2022    LYMPHSABS 1.86 02/08/2022    MONOSABS 0.46 02/08/2022    EOSABS 0.24 02/08/2022    BASOSABS 0.03 02/08/2022    AUTOIGNUM 0.01 02/08/2022    NRBC 0.0 02/08/2022       Physical Exam:  Physical Exam  Vitals reviewed.   Constitutional:       General: He is not in acute distress.     Appearance: Normal appearance. He is normal weight. He is not ill-appearing, toxic-appearing or diaphoretic.   HENT:      Head: Normocephalic and atraumatic.   Eyes:      Extraocular Movements: Extraocular movements intact.   Cardiovascular:      Rate and Rhythm: Normal rate.   Pulmonary:      Effort: Pulmonary effort is normal.   Neurological:      Mental Status: He is alert.   Psychiatric:         Mood and Affect: Mood normal.         Thought Content: Thought content normal.         Judgment: Judgment normal.         Assessment / Plan      Assessment & Plan:  Problem List Items Addressed This Visit        Other    Uncontrolled type 2 diabetes mellitus with hyperglycemia (HCC) - Primary    Current Assessment & Plan     Body build, age at onset and lack of family history make me concerned that he might have late onset type 1 diabetes.  Will check typing studies  If they confirm type 1, he will definitely need meal time insulin. If they confirm type 2, we could add prandin at meals         Relevant Medications    glucose monitor monitoring kit    glucose blood test strip    Lancets misc    metFORMIN ER (GLUCOPHAGE-XR) 500 MG 24 hr tablet    empagliflozin (Jardiance) 25 MG tablet tablet    Other Relevant Orders    POC Glucose, Blood (Completed)    POC Glycosylated Hemoglobin (Hb A1C) (Completed)     C-Peptide    Anti-islet Cell Antibody    Glutamic Acid Decarboxylase    Anti-islet Cell Antibody   Other Visit Diagnoses     Type II diabetes mellitus with hyperosmolarity, uncontrolled (HCC)        Relevant Orders    C-Peptide    Anti-islet Cell Antibody    Glutamic Acid Decarboxylase           Kurt Gibbs MD   10/21/2022

## 2022-10-21 NOTE — ASSESSMENT & PLAN NOTE
Body build, age at onset and lack of family history make me concerned that he might have late onset type 1 diabetes.  Will check typing studies  If they confirm type 1, he will definitely need meal time insulin. If they confirm type 2, we could add prandin at meals

## 2022-10-24 LAB
C PEPTIDE SERPL-MCNC: 5.6 NG/ML (ref 1.1–4.4)
GAD65 AB SER IA-ACNC: <5 U/ML (ref 0–5)
PANC ISLET CELL AB TITR SER: NEGATIVE {TITER}

## 2022-10-27 ENCOUNTER — TELEPHONE (OUTPATIENT)
Dept: ENDOCRINOLOGY | Facility: CLINIC | Age: 42
End: 2022-10-27

## 2022-10-27 NOTE — TELEPHONE ENCOUNTER
Spoke w/ patient in regards to his lab results and new medication that Dr. Gibbs will be sending in for him today. He demonstrated understanding of his DM Type 2 diagnosis and addition of Glimepiride to his regimen. Should have scheduled 3mo f/u with front staff.

## 2023-01-05 ENCOUNTER — OFFICE VISIT (OUTPATIENT)
Dept: FAMILY MEDICINE CLINIC | Facility: CLINIC | Age: 43
End: 2023-01-05
Payer: MEDICAID

## 2023-01-05 VITALS
SYSTOLIC BLOOD PRESSURE: 122 MMHG | DIASTOLIC BLOOD PRESSURE: 74 MMHG | BODY MASS INDEX: 28.37 KG/M2 | OXYGEN SATURATION: 97 % | HEART RATE: 76 BPM | WEIGHT: 198.2 LBS | HEIGHT: 70 IN | TEMPERATURE: 98.6 F

## 2023-01-05 DIAGNOSIS — I10 PRIMARY HYPERTENSION: ICD-10-CM

## 2023-01-05 DIAGNOSIS — Z00.00 PHYSICAL EXAM, ANNUAL: Primary | ICD-10-CM

## 2023-01-05 DIAGNOSIS — Z23 IMMUNIZATION DUE: ICD-10-CM

## 2023-01-05 DIAGNOSIS — Z13.220 SCREENING FOR CHOLESTEROL LEVEL: ICD-10-CM

## 2023-01-05 DIAGNOSIS — E11.65 UNCONTROLLED TYPE 2 DIABETES MELLITUS WITH HYPERGLYCEMIA: ICD-10-CM

## 2023-01-05 DIAGNOSIS — Z13.0 SCREENING FOR DEFICIENCY ANEMIA: ICD-10-CM

## 2023-01-05 DIAGNOSIS — Z13.29 SCREENING FOR THYROID DISORDER: ICD-10-CM

## 2023-01-05 DIAGNOSIS — Z13.1 SCREENING FOR DIABETES MELLITUS: ICD-10-CM

## 2023-01-05 DIAGNOSIS — E78.2 MIXED HYPERLIPIDEMIA: ICD-10-CM

## 2023-01-05 PROCEDURE — 90471 IMMUNIZATION ADMIN: CPT | Performed by: PHYSICIAN ASSISTANT

## 2023-01-05 PROCEDURE — 90472 IMMUNIZATION ADMIN EACH ADD: CPT | Performed by: PHYSICIAN ASSISTANT

## 2023-01-05 PROCEDURE — 99396 PREV VISIT EST AGE 40-64: CPT | Performed by: PHYSICIAN ASSISTANT

## 2023-01-05 PROCEDURE — 90715 TDAP VACCINE 7 YRS/> IM: CPT | Performed by: PHYSICIAN ASSISTANT

## 2023-01-05 PROCEDURE — 90686 IIV4 VACC NO PRSV 0.5 ML IM: CPT | Performed by: PHYSICIAN ASSISTANT

## 2023-01-05 NOTE — PROGRESS NOTES
Chief Complaint   Patient presents with   • Annual Exam   • Hypertension   • Med Refill       Vj Zapata is a pleasant 42 y.o. male who is here for annual physical exam.  Patient presents for management of hypertension, diabetes type 2, and hyperlipidemia.  Patient is doing well today and has no concerns or complaints.  Patient's blood pressure is stable and well-controlled.  Patient is compliant on medications.  He is established with endocrinology.      His son is present at appointment and interprets for patient.    Past Medical History:   Diagnosis Date   • Diabetes mellitus (HCC)    • Heart rate fast    • Muscle pain    • Visual impairment        History reviewed. No pertinent surgical history.    History reviewed. No pertinent family history.    Social History     Socioeconomic History   • Marital status:    Tobacco Use   • Smoking status: Never   • Smokeless tobacco: Never   Vaping Use   • Vaping Use: Never used   Substance and Sexual Activity   • Alcohol use: Never   • Drug use: Never   • Sexual activity: Yes     Partners: Female       No Known Allergies    ROS  Review of Systems   Constitutional: Negative for chills, diaphoresis, fatigue and fever.   HENT: Negative for congestion, ear pain, hearing loss, postnasal drip, rhinorrhea and sore throat.    Eyes: Negative for blurred vision, pain and visual disturbance.   Respiratory: Negative for cough, shortness of breath and wheezing.    Cardiovascular: Negative for chest pain and leg swelling.   Gastrointestinal: Negative for abdominal pain, blood in stool, constipation, diarrhea, nausea, vomiting and indigestion.   Endocrine: Negative for polyuria.   Genitourinary: Negative for dysuria, flank pain and hematuria.   Musculoskeletal: Negative for arthralgias, gait problem and myalgias.   Skin: Negative for rash and skin lesions.   Neurological: Negative for dizziness, weakness, light-headedness, numbness and headache.   Psychiatric/Behavioral:  Negative for self-injury, sleep disturbance, suicidal ideas, depressed mood and stress. The patient is not nervous/anxious.        Vitals:    01/05/23 1326   BP: 122/74   BP Location: Left arm   Patient Position: Sitting   Cuff Size: Adult   Pulse: 76   Temp: 98.6 °F (37 °C)   TempSrc: Temporal   SpO2: 97%   Weight: 89.9 kg (198 lb 3.2 oz)   Height: 177.8 cm (70\")   PainSc: 0-No pain     Body mass index is 28.44 kg/m².    BMI is >= 25 and <30. (Overweight) The following options were offered after discussion;: exercise counseling/recommendations and nutrition counseling/recommendations       Current Outpatient Medications on File Prior to Visit   Medication Sig Dispense Refill   • Blood Glucose Monitoring Suppl (FreeStyle Lite) w/Device kit USE UNIT TO CHECK GLUCOSE ONCE DAILY     • cholecalciferol (VITAMIN D3) 1.25 MG (15546 UT) capsule Take 1 capsule by mouth 1 (One) Time Per Week. 12 capsule 1   • empagliflozin (Jardiance) 25 MG tablet tablet Take 1 tablet by mouth Daily. 90 tablet 0   • glucose blood test strip Use to check glucose 1-2 times a day. 50 each 11   • glucose monitor monitoring kit 1 each Daily. 1 each 0   • ibuprofen (ADVIL,MOTRIN) 600 MG tablet Take 1 tablet by mouth Every 6 (Six) Hours As Needed for Mild Pain . 180 tablet 1   • Lancets misc Use 1-2 daily to check blood sugar. 100 each 11   • lisinopril (PRINIVIL,ZESTRIL) 10 MG tablet Take 1 tablet by mouth Daily. 90 tablet 1   • metFORMIN ER (GLUCOPHAGE-XR) 500 MG 24 hr tablet Take 2 tablets by mouth 2 (Two) Times a Day With Meals. 1 tab in AM with breakfast and 1 tab in PM with dinner. 360 tablet 0   • rosuvastatin (Crestor) 20 MG tablet Take 1 tablet by mouth Every Night. 90 tablet 1   • insulin detemir (Levemir FlexTouch) 100 UNIT/ML injection Inject 20 Units under the skin into the appropriate area as directed Every Night for 30 days. 2 pen 3     No current facility-administered medications on file prior to visit.       Results for orders  placed or performed in visit on 10/21/22   Glutamic Acid Decarboxylase    Specimen: Blood    Blood  Release to donna   Result Value Ref Range    JOHNATHON-65 <5.0 0.0 - 5.0 U/mL   C-Peptide    Specimen: Blood    Blood  Release to donna   Result Value Ref Range    C-Peptide 5.6 (H) 1.1 - 4.4 ng/mL   Anti-islet Cell Antibody    Blood  Release to donna   Result Value Ref Range    Islet Cell Ab Negative Neg:<1:1   POC Glucose, Blood    Specimen: Blood   Result Value Ref Range    Glucose 387 (A) 70 - 130 mg/dL    Lot Number [id number]     Expiration Date 3/29/23    POC Glycosylated Hemoglobin (Hb A1C)    Specimen: Blood   Result Value Ref Range    Hemoglobin A1C 11.3 %    Lot Number [id number]     Expiration Date 5/2/24        PE    Physical Exam  Vitals reviewed.   Constitutional:       General: He is not in acute distress.     Appearance: Normal appearance. He is well-developed and normal weight. He is not ill-appearing or diaphoretic.   HENT:      Head: Normocephalic and atraumatic.      Right Ear: Hearing, tympanic membrane, ear canal and external ear normal.      Left Ear: Hearing, tympanic membrane, ear canal and external ear normal.      Nose: Nose normal.      Right Sinus: No maxillary sinus tenderness or frontal sinus tenderness.      Left Sinus: No maxillary sinus tenderness or frontal sinus tenderness.      Mouth/Throat:      Pharynx: Uvula midline.   Eyes:      General: Lids are normal.      Extraocular Movements: Extraocular movements intact.      Conjunctiva/sclera: Conjunctivae normal.   Neck:      Thyroid: No thyroid mass or thyromegaly.      Trachea: Trachea and phonation normal.   Cardiovascular:      Rate and Rhythm: Normal rate and regular rhythm.      Heart sounds: Normal heart sounds.   Pulmonary:      Effort: Pulmonary effort is normal.      Breath sounds: Normal breath sounds.   Abdominal:      General: Bowel sounds are normal. There is no distension.      Palpations: Abdomen is soft. Abdomen is not rigid.       Tenderness: There is no abdominal tenderness. There is no guarding.   Musculoskeletal:         General: Normal range of motion.      Cervical back: Normal range of motion.      Right lower leg: No edema.      Left lower leg: No edema.   Lymphadenopathy:      Cervical: No cervical adenopathy.      Right cervical: No superficial cervical adenopathy.     Left cervical: No superficial cervical adenopathy.   Skin:     General: Skin is warm.      Findings: No erythema or rash.      Nails: There is no clubbing.   Neurological:      Mental Status: He is alert and oriented to person, place, and time.      Coordination: Coordination normal.      Gait: Gait normal.      Deep Tendon Reflexes: Reflexes are normal and symmetric.      Comments: CN grossly intact   Psychiatric:         Attention and Perception: Attention and perception normal. He is attentive.         Mood and Affect: Mood and affect normal.         Speech: Speech normal.         Behavior: Behavior normal. Behavior is cooperative.         Thought Content: Thought content normal.         Cognition and Memory: Cognition and memory normal.         Judgment: Judgment normal.         A/P    Diagnoses and all orders for this visit:    1. Physical exam, annual (Primary)  -     CBC Auto Differential; Future  -     Comprehensive Metabolic Panel; Future  -     TSH Rfx On Abnormal To Free T4; Future  -     Lipid Panel; Future  -     Microalbumin / Creatinine Urine Ratio - Urine, Clean Catch; Future  -     Vitamin B12; Future  PE completed  Preventative labs ordered  Vaccinations discussed    2. Primary hypertension  Stable, well-controlled.  Compliant on medication.    3. Mixed hyperlipidemia  On rosuvastatin 20 mg nightly.    4. Uncontrolled type 2 diabetes mellitus with hyperglycemia (HCC)  -     Microalbumin / Creatinine Urine Ratio - Urine, Clean Catch; Future  -     Vitamin B12; Future  Hemoglobin AIC was 11.3% recently.  Patient is compliant on  medications.  Established with endocrinology.  Has upcoming appointment.    5. Screening for deficiency anemia  -     CBC Auto Differential; Future    6. Screening for thyroid disorder  -     TSH Rfx On Abnormal To Free T4; Future    7. Screening for diabetes mellitus  -     Comprehensive Metabolic Panel; Future    8. Screening for cholesterol level  -     Lipid Panel; Future    9. Immunization due  -     FluLaval/Fluzone >6 mos ([id number])  -     Tdap Vaccine Greater Than or Equal To 6yo IM         Plan of care reviewed with patient at the conclusion of today's visit. Education was provided regarding diet , nutrition , exercise, weight management, preventative screenings, vaccinations, hypertension and diabetes diagnosis, management and any prescribed or recommended OTC medications.  Patient verbalizes understanding of and agreement with management plan.    Dictated Utilizing Dragon Dictation     Please note that portions of this note were completed with a voice recognition program.     Part of this note may be an electronic transcription/translation of spoken language to printed text using the Dragon Dictation System.    Return in about 7 months (around 8/5/2023) for Recheck, 6 month follow-up.     Lilliam Cervantes PA-C

## 2023-01-05 NOTE — LETTER
VACCINE CONSENT FORM      Patient Name:  Vj Zapata    Patient :  1980      I/We have read, or have been explained, the information about the diseases and the vaccines listed below.  There was an opportunity to ask questions and any questions were answered satisfactorily.  I/We believe that I/we understand the benefits and risks of the vaccines(s) cited, and ask the vaccine(s) listed below be given to me/us or the person named above (for which i have authorized to make the request).      Vaccine(s) give:    Orders Placed This Encounter   Procedures   • FluLaval/Fluzone >6 mos ([id number])   • Tdap Vaccine Greater Than or Equal To 8yo IM         Medicare patients:    The only vaccine covered under your medical benefit is flu/pneumonia and hepatitis B.  All other may be covered under your \"Part D\" prescription plan and requires you to go to a pharmacy with a Physician orders for administration.  If you still prefer to have it administered at our office, you will receive a bill for the vaccine and administration cost.               Patient Initials                     Patient or Parent/Guardian Signature                    Date        A copy of the appropriate Centers for Disease Control and Prevention Vaccine Information Statements has been provided.

## 2023-01-06 ENCOUNTER — LAB (OUTPATIENT)
Dept: LAB | Facility: HOSPITAL | Age: 43
End: 2023-01-06
Payer: MEDICAID

## 2023-01-06 DIAGNOSIS — Z00.00 PHYSICAL EXAM, ANNUAL: ICD-10-CM

## 2023-01-06 DIAGNOSIS — Z13.29 SCREENING FOR THYROID DISORDER: ICD-10-CM

## 2023-01-06 DIAGNOSIS — E11.65 UNCONTROLLED TYPE 2 DIABETES MELLITUS WITH HYPERGLYCEMIA: ICD-10-CM

## 2023-01-06 DIAGNOSIS — Z13.220 SCREENING FOR CHOLESTEROL LEVEL: ICD-10-CM

## 2023-01-06 DIAGNOSIS — Z13.1 SCREENING FOR DIABETES MELLITUS: ICD-10-CM

## 2023-01-06 DIAGNOSIS — Z13.0 SCREENING FOR DEFICIENCY ANEMIA: ICD-10-CM

## 2023-01-07 LAB
ALBUMIN SERPL-MCNC: 4.4 G/DL (ref 3.5–5.2)
ALBUMIN/CREAT UR: 21 MG/G CREAT (ref 0–29)
ALBUMIN/GLOB SERPL: 1.7 G/DL
ALP SERPL-CCNC: 87 U/L (ref 39–117)
ALT SERPL-CCNC: 15 U/L (ref 1–41)
AST SERPL-CCNC: 23 U/L (ref 1–40)
BASOPHILS # BLD AUTO: 0.03 10*3/MM3 (ref 0–0.2)
BASOPHILS NFR BLD AUTO: 0.5 % (ref 0–1.5)
BILIRUB SERPL-MCNC: 0.4 MG/DL (ref 0–1.2)
BUN SERPL-MCNC: 8 MG/DL (ref 6–20)
BUN/CREAT SERPL: 8.4 (ref 7–25)
CALCIUM SERPL-MCNC: 8.9 MG/DL (ref 8.6–10.5)
CHLORIDE SERPL-SCNC: 104 MMOL/L (ref 98–107)
CHOLEST SERPL-MCNC: 171 MG/DL (ref 0–200)
CO2 SERPL-SCNC: 24.8 MMOL/L (ref 22–29)
CREAT SERPL-MCNC: 0.95 MG/DL (ref 0.76–1.27)
CREAT UR-MCNC: 98.7 MG/DL
EGFRCR SERPLBLD CKD-EPI 2021: 101.9 ML/MIN/1.73
EOSINOPHIL # BLD AUTO: 0.36 10*3/MM3 (ref 0–0.4)
EOSINOPHIL NFR BLD AUTO: 5.4 % (ref 0.3–6.2)
ERYTHROCYTE [DISTWIDTH] IN BLOOD BY AUTOMATED COUNT: 13.9 % (ref 12.3–15.4)
GLOBULIN SER CALC-MCNC: 2.6 GM/DL
GLUCOSE SERPL-MCNC: 162 MG/DL (ref 65–99)
HCT VFR BLD AUTO: 40.3 % (ref 37.5–51)
HDLC SERPL-MCNC: 45 MG/DL (ref 40–60)
HGB BLD-MCNC: 12.8 G/DL (ref 13–17.7)
IMM GRANULOCYTES # BLD AUTO: 0.01 10*3/MM3 (ref 0–0.05)
IMM GRANULOCYTES NFR BLD AUTO: 0.2 % (ref 0–0.5)
LDLC SERPL CALC-MCNC: 107 MG/DL (ref 0–100)
LYMPHOCYTES # BLD AUTO: 2.11 10*3/MM3 (ref 0.7–3.1)
LYMPHOCYTES NFR BLD AUTO: 31.8 % (ref 19.6–45.3)
MCH RBC QN AUTO: 25.7 PG (ref 26.6–33)
MCHC RBC AUTO-ENTMCNC: 31.8 G/DL (ref 31.5–35.7)
MCV RBC AUTO: 80.8 FL (ref 79–97)
MICROALBUMIN UR-MCNC: 20.3 UG/ML
MONOCYTES # BLD AUTO: 0.64 10*3/MM3 (ref 0.1–0.9)
MONOCYTES NFR BLD AUTO: 9.7 % (ref 5–12)
NEUTROPHILS # BLD AUTO: 3.48 10*3/MM3 (ref 1.7–7)
NEUTROPHILS NFR BLD AUTO: 52.4 % (ref 42.7–76)
NRBC BLD AUTO-RTO: 0 /100 WBC (ref 0–0.2)
PLATELET # BLD AUTO: 233 10*3/MM3 (ref 140–450)
POTASSIUM SERPL-SCNC: 3.6 MMOL/L (ref 3.5–5.2)
PROT SERPL-MCNC: 7 G/DL (ref 6–8.5)
RBC # BLD AUTO: 4.99 10*6/MM3 (ref 4.14–5.8)
SODIUM SERPL-SCNC: 139 MMOL/L (ref 136–145)
TRIGL SERPL-MCNC: 103 MG/DL (ref 0–150)
TSH SERPL DL<=0.005 MIU/L-ACNC: 2.65 UIU/ML (ref 0.27–4.2)
VIT B12 SERPL-MCNC: 708 PG/ML (ref 211–946)
VLDLC SERPL CALC-MCNC: 19 MG/DL (ref 5–40)
WBC # BLD AUTO: 6.63 10*3/MM3 (ref 3.4–10.8)

## 2023-01-09 DIAGNOSIS — E11.65 UNCONTROLLED TYPE 2 DIABETES MELLITUS WITH HYPERGLYCEMIA: ICD-10-CM

## 2023-01-09 DIAGNOSIS — E78.2 MIXED HYPERLIPIDEMIA: Primary | ICD-10-CM

## 2023-01-09 RX ORDER — ATORVASTATIN CALCIUM 40 MG/1
40 TABLET, FILM COATED ORAL DAILY
Qty: 90 TABLET | Refills: 1 | Status: SHIPPED | OUTPATIENT
Start: 2023-01-09

## 2023-02-10 ENCOUNTER — OFFICE VISIT (OUTPATIENT)
Dept: ENDOCRINOLOGY | Facility: CLINIC | Age: 43
End: 2023-02-10
Payer: MEDICAID

## 2023-02-10 VITALS
OXYGEN SATURATION: 98 % | WEIGHT: 194 LBS | HEART RATE: 64 BPM | BODY MASS INDEX: 27.77 KG/M2 | HEIGHT: 70 IN | SYSTOLIC BLOOD PRESSURE: 110 MMHG | DIASTOLIC BLOOD PRESSURE: 68 MMHG

## 2023-02-10 DIAGNOSIS — E11.65 UNCONTROLLED TYPE 2 DIABETES MELLITUS WITH HYPERGLYCEMIA: Primary | ICD-10-CM

## 2023-02-10 LAB
EXPIRATION DATE: ABNORMAL
EXPIRATION DATE: NORMAL
GLUCOSE BLDC GLUCOMTR-MCNC: 180 MG/DL (ref 70–130)
HBA1C MFR BLD: 10.3 %
Lab: ABNORMAL
Lab: NORMAL

## 2023-02-10 PROCEDURE — 82947 ASSAY GLUCOSE BLOOD QUANT: CPT | Performed by: INTERNAL MEDICINE

## 2023-02-10 PROCEDURE — 99213 OFFICE O/P EST LOW 20 MIN: CPT | Performed by: INTERNAL MEDICINE

## 2023-02-10 PROCEDURE — 3046F HEMOGLOBIN A1C LEVEL >9.0%: CPT | Performed by: INTERNAL MEDICINE

## 2023-02-10 PROCEDURE — 83036 HEMOGLOBIN GLYCOSYLATED A1C: CPT | Performed by: INTERNAL MEDICINE

## 2023-02-10 RX ORDER — PEN NEEDLE, DIABETIC 32GX 5/32"
NEEDLE, DISPOSABLE MISCELLANEOUS
Qty: 100 EACH | Refills: 3 | Status: SHIPPED | OUTPATIENT
Start: 2023-02-10

## 2023-02-10 RX ORDER — BLOOD-GLUCOSE METER
1 KIT MISCELLANEOUS DAILY
Qty: 1 EACH | Refills: 0 | Status: SHIPPED | OUTPATIENT
Start: 2023-02-10

## 2023-02-10 RX ORDER — METFORMIN HYDROCHLORIDE 500 MG/1
1000 TABLET, EXTENDED RELEASE ORAL 2 TIMES DAILY WITH MEALS
Qty: 360 TABLET | Refills: 0
Start: 2023-02-10

## 2023-02-10 RX ORDER — INSULIN DETEMIR 100 [IU]/ML
30 INJECTION, SOLUTION SUBCUTANEOUS NIGHTLY
Qty: 9 ML | Refills: 5 | Status: SHIPPED | OUTPATIENT
Start: 2023-02-10 | End: 2023-03-12

## 2023-02-10 NOTE — PROGRESS NOTES
"     Office Note      Date: 02/10/2023  Patient Name: Vj Zapata  MRN: [id number]  : 1980    Chief Complaint   Patient presents with   • Diabetes     Type II       History of Present Illness:   Vj Zapata is a 42 y.o. male who presents for Diabetes type 2.   Current RX daily insulin and metformin    Bg checks are done:none   Hypoglycemia :once       Last A1c:  Hemoglobin A1C   Date Value Ref Range Status   02/10/2023 10.3 % Final       Changes in health since last visit: none . Last eye exam due and advised .    Subjective              Review of Systems:   Review of Systems   Constitutional: Negative.    HENT: Negative.    Eyes: Negative.    Respiratory: Negative.        The following portions of the patient's history were reviewed and updated as appropriate: allergies, current medications, past family history, past medical history, past social history, past surgical history and problem list.    Objective     Visit Vitals  /68 (BP Location: Left arm, Patient Position: Sitting, Cuff Size: Adult)   Pulse 64   Ht 177.8 cm (70\")   Wt 88 kg (194 lb)   SpO2 98%   BMI 27.84 kg/m²           Physical Exam:  Physical Exam  Vitals reviewed.   Constitutional:       Appearance: Normal appearance.   Neurological:      Mental Status: He is alert.   Psychiatric:         Mood and Affect: Mood normal.         Behavior: Behavior normal.         Thought Content: Thought content normal.         Judgment: Judgment normal.          Assessment / Plan      Assessment & Plan:  Problem List Items Addressed This Visit        Other    Uncontrolled type 2 diabetes mellitus with hyperglycemia (HCC) - Primary    Current Assessment & Plan      Improved with a1c down from 11.3 to 10.3 despite running out of insulin          Relevant Medications    Lancets misc    atorvastatin (Lipitor) 40 MG tablet    empagliflozin (Jardiance) 25 MG tablet tablet    metFORMIN ER (GLUCOPHAGE-XR) 500 MG 24 hr tablet    insulin detemir (Levemir " FlexTouch) 100 UNIT/ML injection    glucose blood test strip    glucose monitor monitoring kit    Other Relevant Orders    POC Glucose, Blood (Completed)    POC Glycosylated Hemoglobin (Hb A1C) (Completed)        Kurt Gibbs MD   02/10/2023

## 2023-02-15 ENCOUNTER — PRIOR AUTHORIZATION (OUTPATIENT)
Dept: ENDOCRINOLOGY | Facility: CLINIC | Age: 43
End: 2023-02-15
Payer: MEDICAID

## 2023-02-15 NOTE — TELEPHONE ENCOUNTER
Vj Zapata Key: [id number] - [place] Case ID: [id number] - Rx #: [id number] help? Call us at [phone]  Outcome  Approvedon February 14  The request has been approved. The authorization is effective for a maximum of 12 fills from 02/14/2023 to 02/13/2024, as long as the member is enrolled in their current health plan. The request was approved as submitted. A written notification letter will follow with additional details.  Drug  Jardiance 25MG tablets  Form  [employer] [place] Medicaid ePA Form 2017 [place]

## 2023-03-28 ENCOUNTER — TELEPHONE (OUTPATIENT)
Dept: ENDOCRINOLOGY | Facility: CLINIC | Age: 43
End: 2023-03-28
Payer: MEDICAID

## 2023-03-28 NOTE — TELEPHONE ENCOUNTER
I can write a letter but please make sure that's all it needs to say. I don't want to have to rewrite it because it needs to say more than just that he has diabetes

## 2023-03-28 NOTE — TELEPHONE ENCOUNTER
Patient called wanting to know if Dr. Gibbs would write him a letter stating her does have diabetes for his job. He said he could pick it up if we are able to write it. His call back [phone]. Please advise.

## 2023-03-29 NOTE — TELEPHONE ENCOUNTER
Pt called back states letter needs to say that he has  Diabetes and Dr Gibbs treat him. Pt will stop by and pickup letter. Pleae notify pt. Please see pervious messages

## 2023-03-30 ENCOUNTER — TELEPHONE (OUTPATIENT)
Dept: ENDOCRINOLOGY | Facility: CLINIC | Age: 43
End: 2023-03-30

## 2023-06-07 DIAGNOSIS — E78.2 MIXED HYPERLIPIDEMIA: ICD-10-CM

## 2023-06-07 DIAGNOSIS — I10 PRIMARY HYPERTENSION: ICD-10-CM

## 2023-06-07 DIAGNOSIS — E11.65 UNCONTROLLED TYPE 2 DIABETES MELLITUS WITH HYPERGLYCEMIA: ICD-10-CM

## 2023-06-07 DIAGNOSIS — M54.9 MECHANICAL BACK PAIN: ICD-10-CM

## 2023-06-07 DIAGNOSIS — E55.9 VITAMIN D DEFICIENCY: ICD-10-CM

## 2023-06-07 NOTE — TELEPHONE ENCOUNTER
Caller: Vj Zapata    Relationship: Self    Best call back number: [phone]     Requested Prescriptions:   Requested Prescriptions     Pending Prescriptions Disp Refills    empagliflozin (Jardiance) 25 MG tablet tablet 90 tablet 0     Sig: Take 1 tablet by mouth Daily.    metFORMIN ER (GLUCOPHAGE-XR) 500 MG 24 hr tablet 360 tablet 0     Sig: Take 2 tablets by mouth 2 (Two) Times a Day With Meals. 1 tab in AM with breakfast and 1 tab in PM with dinner.    atorvastatin (Lipitor) 40 MG tablet 90 tablet 1     Sig: Take 1 tablet by mouth Daily.    cholecalciferol (VITAMIN D3) 1.25 MG (74197 UT) capsule 12 capsule 1     Sig: Take 1 capsule by mouth 1 (One) Time Per Week.    ibuprofen (ADVIL,MOTRIN) 600 MG tablet 180 tablet 1     Sig: Take 1 tablet by mouth Every 6 (Six) Hours As Needed for Mild Pain.    lisinopril (PRINIVIL,ZESTRIL) 10 MG tablet 90 tablet 1     Sig: Take 1 tablet by mouth Daily.    glucose blood test strip 50 each 11     Sig: Use to check glucose 1-2 times a day.    Lancets misc 100 each 11     Sig: Use 1-2 daily to check blood sugar.    Insulin Pen Needle (BD Pen Needle Annabelle 2nd Gen) 32G X 4 MM misc 100 each 3     Sig: Use dailly to give insulin    insulin detemir (Levemir FlexTouch) 100 UNIT/ML injection 9 mL 5     Sig: Inject 30 Units under the skin into the appropriate area as directed Every Night for 30 days.        Pharmacy where request should be sent: [address] [phone] [facility] [phone] [facility]     Last office visit with prescribing clinician: 1/5/2023   Last telemedicine visit with prescribing clinician: Visit date not found   Next office visit with prescribing clinician: 7/10/2023     Additional details provided by patient: PATIENT REQUESTED ALL MEDICATIONS STATING THAT HE IS OUT OF EVERYTHING.    Does the patient have less than a 3 day supply:  [x] Yes  [] No    Would you like a call back once the refill request has been  completed: [x] Yes [] No    If the office needs to give you a call back, can they leave a voicemail: [x] Yes [] No    Matilde Baltazar Rep   06/07/23 15:58 EDT

## 2023-06-08 RX ORDER — PEN NEEDLE, DIABETIC 32GX 5/32"
NEEDLE, DISPOSABLE MISCELLANEOUS
Qty: 100 EACH | Refills: 3 | OUTPATIENT
Start: 2023-06-08

## 2023-06-08 RX ORDER — INSULIN DETEMIR 100 [IU]/ML
30 INJECTION, SOLUTION SUBCUTANEOUS NIGHTLY
Qty: 9 ML | Refills: 5 | OUTPATIENT
Start: 2023-06-08 | End: 2023-07-08

## 2023-06-08 RX ORDER — METFORMIN HYDROCHLORIDE 500 MG/1
1000 TABLET, EXTENDED RELEASE ORAL 2 TIMES DAILY WITH MEALS
Qty: 360 TABLET | Refills: 0
Start: 2023-06-08

## 2023-06-08 RX ORDER — LISINOPRIL 10 MG/1
10 TABLET ORAL DAILY
Qty: 90 TABLET | Refills: 0 | Status: SHIPPED | OUTPATIENT
Start: 2023-06-08

## 2023-06-08 RX ORDER — LANCETS 30 GAUGE
EACH MISCELLANEOUS
Qty: 100 EACH | Refills: 3 | Status: SHIPPED | OUTPATIENT
Start: 2023-06-08

## 2023-06-08 RX ORDER — ATORVASTATIN CALCIUM 40 MG/1
40 TABLET, FILM COATED ORAL DAILY
Qty: 90 TABLET | Refills: 0 | Status: SHIPPED | OUTPATIENT
Start: 2023-06-08

## 2023-06-08 RX ORDER — IBUPROFEN 600 MG/1
600 TABLET ORAL EVERY 6 HOURS PRN
Qty: 180 TABLET | Refills: 0 | Status: SHIPPED | OUTPATIENT
Start: 2023-06-08

## 2023-06-16 ENCOUNTER — TELEPHONE (OUTPATIENT)
Dept: FAMILY MEDICINE CLINIC | Facility: CLINIC | Age: 43
End: 2023-06-16
Payer: MEDICAID

## 2023-06-16 NOTE — TELEPHONE ENCOUNTER
Caller: Kael Zapata    Relationship: Self    Best call back number: [phone]     What is the best time to reach you: AROUND 1 PM    Who are you requesting to speak with (clinical staff, provider,  specific staff member): CLINICAL    Do you know the name of the person who called: KAEL    What was the call regarding: PATIENT STATES THAT HIS INSURANCE WILL NOT COVER HIS MEDICATIONS. HE SAID THEY REQUESTED THAT WE CHANGE HIS MEDICATIONS BUT HE'S NOT SURE. PLEASE FOLLOW UP    Is it okay if the provider responds through MyChart: NO

## 2023-06-19 NOTE — TELEPHONE ENCOUNTER
Pharmacist returned call to gather further information. Provider demographic info and Medicaid ID    Patient is not fully enrolled for managed care medicaid or [employer] and they cannot properly fill Rx's- pharmacy will continue to try to reach patient to get updated insurance info

## 2023-06-19 NOTE — TELEPHONE ENCOUNTER
Contacted pharmacy to inquire further, all Rx's on hold due. Pharmacist states Medicaid will cover all recent prescriptions and she will call us if she is unable to fill medicines.     Attempted to contact patient, no answer. Unable to leave voicemail.    Subjective & objective note cannot be loaded without a specified hospital service. Pt presents with possible UTI. She has had burning,yes and  Frequency,yes. This has been present for the past  1day . She has had UTI's in the past.    Back Pain no  Vaginal D/C: no   Blood in urine: yes   No nausea, vomiting, diarrhea, constipation, . No HA or numbness and tingling in the extremities or blurred vision. No chest pain or SOB. ROS [-] otherwise  Smoke [-]     Past Medical History:   Diagnosis Date   â¢ Asymptomatic postmenopausal status    â¢ Hemorrhoid    â¢ History of basal cell cancer    â¢ Keloid scar of skin    â¢ Osteopenia    â¢ Osteopenia 2014   â¢ Other psoriasis       Past Surgical History:   Procedure Laterality Date   â¢ Bowel resection  08/19/2019    for precancer on colonoscopy biopsy    â¢ Breast biopsy      benign    â¢ Colonoscopy  2005, 2010, 2016   â¢ Pelvic laparoscopy      x 2 for infertility    â¢ Polypectomy  2000   â¢ Skin excision  2017    x 2 for perianal nevus       Social History     Tobacco Use   â¢ Smoking status: Never Smoker   â¢ Smokeless tobacco: Never Used   Substance Use Topics   â¢ Alcohol use: No   â¢ Drug use: No      Current Outpatient Medications   Medication Sig Dispense Refill   â¢ calcium carbonate-vitamin D (CALTRATE+D) 600-400 MG-UNIT per tablet Take 1 tablet by mouth daily. â¢ fexofenadine (ALLEGRA) 180 MG tablet Take 1 tablet by mouth daily. 30 tablet 5   â¢ Multiple Vitamin (MULTI-VITAMIN DAILY PO) Take 1 tablet by mouth daily. â¢ Flaxseed Oil (LINSEED OIL) Oil        No current facility-administered medications for this visit. Facility-Administered Medications Ordered in Other Visits   Medication Dose Route Frequency Provider Last Rate Last Admin   â¢ lidocaine 1 % injection 100 mg  10 mL Subcutaneous Once Kayla Romero MD              NAD, Blood pressure 124/72, pulse 86, temperature 96.8 Â°F (36 Â°C), temperature source Tympanic, resp. rate 16, SpO2 98 %.  Eyes revealed no jaundice or injection. Lloyds sign [-] bilaterally      Abdomen revealed : BS [+], no masses or distension and no pain with palpation         Peripheral pulse was intact, capillary refill was normal, sensory function was intact and no edema        UA: Results were  independently reviewed and interpreted and discussed with patient  URINE CULTURE: ORDERED and results will be independently reviewed and discussed with patient when available            Assessment:  UTI                 . Plan: Symptomatic measures discussed and we placed her on meds. The urine was sent for C&S           [place] will follow up with her primary care physician as needed or as directed but may return to the [facility] if needed.  If symptoms become severe, pt instructed to go to the [address] [place] [address], DO

## 2023-08-11 ENCOUNTER — OFFICE VISIT (OUTPATIENT)
Dept: ENDOCRINOLOGY | Facility: CLINIC | Age: 43
End: 2023-08-11
Payer: MEDICAID

## 2023-08-11 VITALS
WEIGHT: 195 LBS | HEIGHT: 70 IN | SYSTOLIC BLOOD PRESSURE: 118 MMHG | OXYGEN SATURATION: 97 % | DIASTOLIC BLOOD PRESSURE: 78 MMHG | BODY MASS INDEX: 27.92 KG/M2 | HEART RATE: 85 BPM

## 2023-08-11 DIAGNOSIS — I10 PRIMARY HYPERTENSION: ICD-10-CM

## 2023-08-11 DIAGNOSIS — E78.2 MIXED HYPERLIPIDEMIA: ICD-10-CM

## 2023-08-11 DIAGNOSIS — E11.65 UNCONTROLLED TYPE 2 DIABETES MELLITUS WITH HYPERGLYCEMIA: Primary | ICD-10-CM

## 2023-08-11 LAB
EXPIRATION DATE: ABNORMAL
EXPIRATION DATE: NORMAL
GLUCOSE BLDC GLUCOMTR-MCNC: 188 MG/DL (ref 70–130)
HBA1C MFR BLD: 11.9 %
Lab: ABNORMAL
Lab: NORMAL

## 2023-08-11 RX ORDER — PEN NEEDLE, DIABETIC 32GX 5/32"
NEEDLE, DISPOSABLE MISCELLANEOUS
Qty: 100 EACH | Refills: 3 | Status: SHIPPED | OUTPATIENT
Start: 2023-08-11

## 2023-08-11 RX ORDER — INSULIN DETEMIR 100 [IU]/ML
30 INJECTION, SOLUTION SUBCUTANEOUS NIGHTLY
Qty: 15 ML | Refills: 5 | Status: SHIPPED | OUTPATIENT
Start: 2023-08-11

## 2023-08-11 RX ORDER — ATORVASTATIN CALCIUM 40 MG/1
40 TABLET, FILM COATED ORAL DAILY
Qty: 90 TABLET | Refills: 1 | Status: SHIPPED | OUTPATIENT
Start: 2023-08-11

## 2023-08-11 RX ORDER — LISINOPRIL 10 MG/1
10 TABLET ORAL DAILY
Qty: 90 TABLET | Refills: 1 | Status: SHIPPED | OUTPATIENT
Start: 2023-08-11

## 2023-08-11 RX ORDER — METFORMIN HYDROCHLORIDE 500 MG/1
1000 TABLET, EXTENDED RELEASE ORAL 2 TIMES DAILY WITH MEALS
Qty: 360 TABLET | Refills: 1
Start: 2023-08-11

## 2023-08-11 NOTE — PROGRESS NOTES
"     Office Note      Date: 2023  Patient Name: Vj Zapata  MRN: [id number]  : 1980    Chief Complaint   Patient presents with    Diabetes     Uncontrolled type 2 diabetes mellitus with hyperglycemia       History of Present Illness:   Vj Zapata is a 43 y.o. male who presents for Diabetes type 2.   Current RX daily insulin, metformin and farxiga. He is only taking 20 units of insulin     Bg checks are done:couple times per week   Hypoglycemia :seldom       Last A1c:  Hemoglobin A1C   Date Value Ref Range Status   2023 11.9 % Final       Changes in health since last visit: none . Last eye exam due and advised .    Subjective              Review of Systems:   Review of Systems   Eyes:  Positive for visual disturbance.   Endocrine: Positive for polydipsia.     The following portions of the patient's history were reviewed and updated as appropriate: allergies, current medications, past family history, past medical history, past social history, past surgical history, and problem list.    Objective     Visit Vitals  /78 (BP Location: Left arm, Patient Position: Sitting, Cuff Size: Adult)   Pulse 85   Ht 177.8 cm (70\")   Wt 88.5 kg (195 lb)   SpO2 97%   BMI 27.98 kg/mý           Physical Exam:  Physical Exam  Vitals reviewed.   Constitutional:       Appearance: Normal appearance.   Neurological:      Mental Status: He is alert.   Psychiatric:         Mood and Affect: Mood normal.         Behavior: Behavior normal.         Thought Content: Thought content normal.         Judgment: Judgment normal.        Assessment / Plan      Assessment & Plan:  Problem List Items Addressed This Visit          Other    Uncontrolled type 2 diabetes mellitus with hyperglycemia - Primary    Current Assessment & Plan      Worse. He has not been taking enough insulin. Encouraged him to take the full prescribed dose of insulin          Relevant Medications    glucose monitor monitoring kit    Lancets misc    " atorvastatin (Lipitor) 40 MG tablet    empagliflozin (Jardiance) 25 MG tablet tablet    glucose blood test strip    insulin detemir (Levemir FlexTouch) 100 UNIT/ML injection    metFORMIN ER (GLUCOPHAGE-XR) 500 MG 24 hr tablet    Other Relevant Orders    POC Glycosylated Hemoglobin (Hb A1C) (Completed)    POC Glucose, Blood (Completed)    Mixed hyperlipidemia    Relevant Medications    atorvastatin (Lipitor) 40 MG tablet    Primary hypertension    Relevant Medications    lisinopril (PRINIVIL,ZESTRIL) 10 MG tablet        Kurt Gibbs MD   08/11/2023

## 2023-08-11 NOTE — ASSESSMENT & PLAN NOTE
Worse. He has not been taking enough insulin. Encouraged him to take the full prescribed dose of insulin

## 2024-11-22 ENCOUNTER — READMISSION MANAGEMENT (OUTPATIENT)
Dept: CALL CENTER | Facility: HOSPITAL | Age: 44
End: 2024-11-22

## 2024-11-23 NOTE — OUTREACH NOTE
Prep Survey      Flowsheet Row Responses   [facility] facility patient discharged from? Non-BH   Is LACE score < 7 ? Non-BH Discharge   Eligibility [facility]   Date of Admission 11/13/24   Date of Discharge 11/22/24   Discharge diagnosis Scrotal infection   Does the patient have one of the following disease processes/diagnoses(primary or secondary)? Other   Prep survey completed? Yes            Chantel SOLANO - Registered Nurse

## 2024-11-25 ENCOUNTER — TRANSITIONAL CARE MANAGEMENT TELEPHONE ENCOUNTER (OUTPATIENT)
Dept: CALL CENTER | Facility: HOSPITAL | Age: 44
End: 2024-11-25

## 2024-11-25 NOTE — OUTREACH NOTE
Call Center TCM Note      Flowsheet Row Responses   [facility] patient discharged from? Non-  [[facility]]   Does the patient have one of the following disease processes/diagnoses(primary or secondary)? Other   TCM attempt successful? Yes   Call start time 1520   Call end time 1525   Discharge diagnosis Scrotal infection   If primary language is not English what is the name and relationship or agency of  used? patient speaks Nepali   Is patient permission given to speak with other caregiver? Yes   List who call center can speak with Patient gave phone to his caregiver Pat   Person spoke with today (if not patient) and relationship Caregiver Pat translated for patient   Meds reviewed with patient/caregiver? Yes   Does the patient have all medications ordered at discharge? Yes   Is the patient taking all medications as directed (includes completed medication regime)? Yes   Comments PCP Lilliam GIBBONS. Office to please schedule needed TCM HFU appt. Message routed to office.   Does the patient have an appointment with their PCP within 7-14 days of discharge? No appointments available   Nursing Interventions Routed TCM call to PCP office, PCP office requested to make appointment - message sent   Has home health visited the patient within 72 hours of discharge? N/A   Psychosocial issues? No   Did the patient receive a copy of their discharge instructions? Yes   What is the patient's perception of their health status since discharge? Improving   Is the patient/caregiver able to teach back signs and symptoms related to disease process for when to call PCP? Yes   Is the patient/caregiver able to teach back the hierarchy of who to call/visit for symptoms/problems? PCP, Specialist, Home health nurse, Urgent Care, ED, 911 Yes   If the patient is a current smoker, are they able to teach back resources for cessation? Not a smoker   TCM call completed? Yes   Wrap up additional comments Patient does  have a urology follow up appt with .   Call end time [id number]   Would this patient benefit from a Referral to [facility] Social Work? No   Is the patient interested in additional calls from an ambulatory ? No            Grecia Frederick RN    11/25/2024, 15:27 EST

## 2024-12-09 ENCOUNTER — OFFICE VISIT (OUTPATIENT)
Dept: FAMILY MEDICINE CLINIC | Facility: CLINIC | Age: 44
End: 2024-12-09

## 2024-12-09 VITALS
SYSTOLIC BLOOD PRESSURE: 138 MMHG | DIASTOLIC BLOOD PRESSURE: 96 MMHG | HEIGHT: 70 IN | HEART RATE: 90 BPM | BODY MASS INDEX: 26.23 KG/M2 | WEIGHT: 183.2 LBS | OXYGEN SATURATION: 99 %

## 2024-12-09 DIAGNOSIS — E78.2 MIXED HYPERLIPIDEMIA: ICD-10-CM

## 2024-12-09 DIAGNOSIS — Z09 HOSPITAL DISCHARGE FOLLOW-UP: Primary | ICD-10-CM

## 2024-12-09 DIAGNOSIS — E11.65 UNCONTROLLED TYPE 2 DIABETES MELLITUS WITH HYPERGLYCEMIA: ICD-10-CM

## 2024-12-09 DIAGNOSIS — I10 PRIMARY HYPERTENSION: ICD-10-CM

## 2024-12-09 PROCEDURE — 99214 OFFICE O/P EST MOD 30 MIN: CPT | Performed by: PHYSICIAN ASSISTANT

## 2024-12-09 RX ORDER — INSULIN DETEMIR 100 [IU]/ML
30 INJECTION, SOLUTION SUBCUTANEOUS NIGHTLY
Qty: 15 ML | Refills: 5 | Status: SHIPPED | OUTPATIENT
Start: 2024-12-09

## 2024-12-09 RX ORDER — PEN NEEDLE, DIABETIC 32GX 5/32"
NEEDLE, DISPOSABLE MISCELLANEOUS
Qty: 100 EACH | Refills: 3 | Status: SHIPPED | OUTPATIENT
Start: 2024-12-09

## 2024-12-09 RX ORDER — LANCETS 30 GAUGE
EACH MISCELLANEOUS
Qty: 100 EACH | Refills: 3 | Status: SHIPPED | OUTPATIENT
Start: 2024-12-09

## 2024-12-09 RX ORDER — METFORMIN HYDROCHLORIDE 500 MG/1
1000 TABLET, EXTENDED RELEASE ORAL 2 TIMES DAILY WITH MEALS
Qty: 360 TABLET | Refills: 1 | Status: SHIPPED | OUTPATIENT
Start: 2024-12-09

## 2024-12-09 RX ORDER — LISINOPRIL 10 MG/1
10 TABLET ORAL DAILY
Qty: 90 TABLET | Refills: 1 | Status: SHIPPED | OUTPATIENT
Start: 2024-12-09

## 2024-12-09 RX ORDER — ATORVASTATIN CALCIUM 40 MG/1
40 TABLET, FILM COATED ORAL DAILY
Qty: 90 TABLET | Refills: 1 | Status: SHIPPED | OUTPATIENT
Start: 2024-12-09

## 2024-12-09 NOTE — PROGRESS NOTES
Laverne Middleton Author: Gagan Thompson MD     1924 MRN [id number]   [facility]  Admission 7/10/19      Last Hospital Discharge 19 PCP Elke Reed MD   Hospital of Discharge  [facility]        CC -admitted to [facility] for subacu Transitional Care Follow Up Visit  Subjective     Vj Zapata is a 44 y.o. male who presents for a transitional care management visit.    Within 48 business hours after discharge our office contacted him via telephone to coordinate his care and needs.      I reviewed and discussed the details of that call along with the discharge summary, hospital problems, inpatient lab results, inpatient diagnostic studies, and consultation reports with Vj.     Current outpatient and discharge medications have been reconciled for the patient.        11/22/2024     7:28 PM   Date of TCM Phone Call   [facility]   Date of Admission 11/13/2024   Date of Discharge 11/22/2024     Risk for Readmission (LACE) No data recorded    History of Present Illness  Patient presents today for routine hospital follow-up.  Patient was recently hospitalized at  from 11/15 to 11/22 for scrotal infection/abscess.  He underwent surgery by urology, Dr. Middleton and was treated with antibiotics.  He reports today that his incision is healing well.  Has pain still.  Denies any drainage.  He has follow-up with surgeon on 01/02.  He recent Hemoglobin A1c was 11.8% on 11/15.  He was started on insulin 26 units nightly.  He is not taking steroids or Jardiance.  He is not on atorvastatin or lisinopril.  He has not been seen by endocrinology, Dr. Gibbs, in over a year.  He is monitoring his glucose and reports a reading of 400 today.  He states he ate rice last night and nothing else.  His son is present today and helps with interpreting.      Hospital Course:    Vj Zapata is a 44 y.o. male who presented with perineal abscess on 11/13/24 and was subsequently admitted to [facility] for further work-up, evaluation, and treatment. On 11/13/24, the patient underwent incision and debridement of scrotum. The procedure was tolerated well with no intraoperative complications. During his hospital stay he was started with IV antibiotics. Hospital medicine was  Mother    • Cancer Brother    • Heart Disorder Brother      Social History    Tobacco Use      Smoking status: Former Smoker        Packs/day: 0.50        Years: 15.00        Pack years: 7.5        Types: Cigarettes        Quit date: 1/1/1970        Years consulted to manage his uncontrolled DM and BP. He was started on insulin for DM and nifedipine for BP. Patient underwent closure of scrotum wound on 11/20/24. The patient was successfully extubated post-operatively and taken to the PACU for immediate postoperative care per protocol. Patient was subsequently placed in a floor bed where the diet was slowly advanced which the patient tolerated well. The remainder of the post-operative course was uncomplicated.     He is referred to high risk discharge clinic and PCP for management of DM and hypertension.     On the day of discharge, the patient was ambulating frequently and independently with pain well controlled on po pain meds, tolerating a regular diet, voiding adequately, and passing flatus and stool. Patient was discharged to home in stable condition without pending results and will follow-up in 6 weeks with Dr. JR Middleton.     Surgeries and Procedures  Procedures performed in this encounter   Procedures   Case Request Operating Room: CLOSURE, WOUND   Case Request Operating Room: EXPLORATION AND DEBRIDEMENT, WOUND, HÉCTOR'S GANGRENE     CLOSURE, WOUND SCROTUM (N/A)         Duration of Hospital Stay:  1113 to 11/22     The following portions of the patient's history were reviewed and updated as appropriate: allergies, current medications, past family history, past medical history, past social history, past surgical history, and problem list.    Current outpatient and discharge medications have been reconciled for the patient.  Reviewed by: Lilliam Cervantes PA-C      Review of Systems   Constitutional:  Negative for chills and fever.   Eyes:  Negative for visual disturbance.   Respiratory:  Negative for cough, shortness of breath and wheezing.    Cardiovascular:  Negative for chest pain, palpitations and leg swelling.   Endocrine: Negative for polyuria.   Genitourinary:         Scrotal discomfort   Neurological:  Negative for dizziness and headache.        Current Outpatient Medications on File Prior to Visit   Medication Sig Dispense Refill    [DISCONTINUED] insulin detemir (Levemir FlexTouch) 100 UNIT/ML injection Inject 30 Units under the skin into the appropriate area as directed Every Night. 15 mL 5    [DISCONTINUED] Insulin Pen Needle (BD Pen Needle Annabelle 2nd Gen) 32G X 4 MM misc Use dailly to give insulin 100 each 3    [DISCONTINUED] lisinopril (PRINIVIL,ZESTRIL) 10 MG tablet Take 1 tablet by mouth Daily. 90 tablet 1    [DISCONTINUED] metFORMIN ER (GLUCOPHAGE-XR) 500 MG 24 hr tablet Take 2 tablets by mouth 2 (Two) Times a Day With Meals. 1 tab in AM with breakfast and 1 tab in PM with dinner. 360 tablet 1    Blood Glucose Monitoring Suppl (FreeStyle Lite) w/Device kit USE UNIT TO CHECK GLUCOSE ONCE DAILY (Patient not taking: Reported on 12/9/2024)      cholecalciferol (VITAMIN D3) 1.25 MG (37472 UT) capsule Take 1 capsule by mouth 1 (One) Time Per Week. (Patient not taking: Reported on 12/9/2024) 12 capsule 1    [DISCONTINUED] atorvastatin (Lipitor) 40 MG tablet Take 1 tablet by mouth Daily. (Patient not taking: Reported on 12/9/2024) 90 tablet 1    [DISCONTINUED] empagliflozin (Jardiance) 25 MG tablet tablet Take 1 tablet by mouth Daily. (Patient not taking: Reported on 12/9/2024) 90 tablet 1    [DISCONTINUED] glucose blood test strip Use to check glucose 1-2 times a day. (Patient not taking: Reported on 12/9/2024) 50 each 11    [DISCONTINUED] glucose monitor monitoring kit 1 each Daily. (Patient not taking: Reported on 12/9/2024) 1 each 0    [DISCONTINUED] ibuprofen (ADVIL,MOTRIN) 600 MG tablet Take 1 tablet by mouth Every 6 (Six) Hours As Needed for Mild Pain. (Patient not taking: Reported on 12/9/2024) 180 tablet 0    [DISCONTINUED] Lancets misc Use 1-2 daily to check blood sugar. (Patient not taking: Reported on 12/9/2024) 100 each 3     No current facility-administered medications on file prior to visit.       Results for orders placed or performed  not apply Misc Apply 1 patch topically daily as needed. Apply to the lumbar area.  Disp: 3 each Rfl: 3   Econazole Nitrate 1 % External Cream 1 application 2 times daily Disp: 30 g Rfl: 4   metRONIDAZOLE (METROCREAM) 0.75 % External Cream Apply 1 Applicatio "in visit on 08/11/23   POC Glucose, Blood    Collection Time: 08/11/23  3:32 PM    Specimen: Blood   Result Value Ref Range    Glucose 188 (A) 70 - 130 mg/dL    Lot Number [id number]     Expiration Date 1/28/2024    POC Glycosylated Hemoglobin (Hb A1C)    Collection Time: 08/11/23  3:35 PM    Specimen: Blood   Result Value Ref Range    Hemoglobin A1C 11.9 %    Lot Number [id number]     Expiration Date 4/13/2024        Visit Vitals  /96 (BP Location: Left arm, Patient Position: Sitting, Cuff Size: Adult)   Pulse 90   Ht 177.8 cm (70\")   Wt 83.1 kg (183 lb 3.2 oz)   SpO2 99%   BMI 26.29 kg/m²     Body mass index is 26.29 kg/m².    Objective   Physical Exam  Vitals reviewed.   Constitutional:       General: He is not in acute distress.     Appearance: Normal appearance. He is well-developed and normal weight. He is not ill-appearing or diaphoretic.   HENT:      Head: Normocephalic and atraumatic.   Eyes:      Extraocular Movements: Extraocular movements intact.      Conjunctiva/sclera: Conjunctivae normal.   Cardiovascular:      Rate and Rhythm: Normal rate and regular rhythm.      Heart sounds: Normal heart sounds.   Pulmonary:      Effort: No respiratory distress.   Musculoskeletal:         General: Normal range of motion.      Cervical back: Normal range of motion.   Neurological:      General: No focal deficit present.      Mental Status: He is alert.   Psychiatric:         Attention and Perception: He is attentive.         Mood and Affect: Mood normal.         Speech: Speech normal.         Behavior: Behavior normal. Behavior is cooperative.         Thought Content: Thought content normal.         Judgment: Judgment normal.         Assessment & Plan   Diagnoses and all orders for this visit:    1. Hospital discharge follow-up (Primary)    2. Uncontrolled type 2 diabetes mellitus with hyperglycemia  -     lisinopril (PRINIVIL,ZESTRIL) 10 MG tablet; Take 1 tablet by mouth Daily.  Dispense: 90 tablet; Refill: " three, cranial nerves are intact, motor and sensory are grossly intact      DIAGNOSTICS REVIEWED AT THIS VISIT:    ASSESSMENT AND PLAN:  Diagnoses and all orders for this visit:    Chronic midline low back pain, unspecified whether sciatica present    Diab 1  -     metFORMIN ER (GLUCOPHAGE-XR) 500 MG 24 hr tablet; Take 2 tablets by mouth 2 (Two) Times a Day With Meals. 1 tab in AM with breakfast and 1 tab in PM with dinner.  Dispense: 360 tablet; Refill: 1  -     atorvastatin (Lipitor) 40 MG tablet; Take 1 tablet by mouth Daily.  Dispense: 90 tablet; Refill: 1  -     insulin detemir (Levemir FlexTouch) 100 UNIT/ML injection; Inject 30 Units under the skin into the appropriate area as directed Every Night.  Dispense: 15 mL; Refill: 5  -     glucose blood test strip; Use to check glucose 1-2 times a day.  Dispense: 200 each; Refill: 3  -     Insulin Pen Needle (BD Pen Needle Annabelle 2nd Gen) 32G X 4 MM misc; Use dailly to give insulin  Dispense: 100 each; Refill: 3  -     Lancets misc; Use 1-2 daily to check blood sugar.  Dispense: 100 each; Refill: 3  Recent hemoglobin A1c was 11.8%.  Discussed the importance of avoiding carbohydrates and sugars.  Discussed that he should focus on eating plants/vegetables and protein.  Will refill his diabetes medications and supplies.  Follow-up with Dr. Gibbs.  Phone number given to patient to call.  Return in 3 months.  Call sooner if he has concerns or questions.    3. Primary hypertension  -     lisinopril (PRINIVIL,ZESTRIL) 10 MG tablet; Take 1 tablet by mouth Daily.  Dispense: 90 tablet; Refill: 1  Elevated today.  Resume lisinopril 10 mg.  Reviewed labs from recent hospitalization.    4. Mixed hyperlipidemia  -     atorvastatin (Lipitor) 40 MG tablet; Take 1 tablet by mouth Daily.  Dispense: 90 tablet; Refill: 1               Dictated Utilizing Dragon Dictation     Please note that portions of this note were completed with a voice recognition program.     Part of this note may be an electronic transcription/translation of spoken language to printed text using the Dragon Dictation System.

## 2025-03-12 ENCOUNTER — OFFICE VISIT (OUTPATIENT)
Dept: FAMILY MEDICINE CLINIC | Facility: CLINIC | Age: 45
End: 2025-03-12
Payer: COMMERCIAL

## 2025-03-12 VITALS
WEIGHT: 196 LBS | SYSTOLIC BLOOD PRESSURE: 162 MMHG | HEART RATE: 80 BPM | OXYGEN SATURATION: 98 % | DIASTOLIC BLOOD PRESSURE: 78 MMHG | BODY MASS INDEX: 28.06 KG/M2 | HEIGHT: 70 IN

## 2025-03-12 DIAGNOSIS — I10 PRIMARY HYPERTENSION: Primary | ICD-10-CM

## 2025-03-12 DIAGNOSIS — E78.2 MIXED HYPERLIPIDEMIA: ICD-10-CM

## 2025-03-12 DIAGNOSIS — E11.65 UNCONTROLLED TYPE 2 DIABETES MELLITUS WITH HYPERGLYCEMIA: ICD-10-CM

## 2025-03-12 LAB
BILIRUB BLD-MCNC: NEGATIVE MG/DL
CLARITY, POC: ABNORMAL
COLOR UR: YELLOW
EXPIRATION DATE: ABNORMAL
EXPIRATION DATE: ABNORMAL
EXPIRATION DATE: NORMAL
GLUCOSE UR STRIP-MCNC: ABNORMAL MG/DL
HBA1C MFR BLD: 10.5 % (ref 4.5–5.7)
KETONES UR QL: NEGATIVE
LEUKOCYTE EST, POC: NEGATIVE
Lab: ABNORMAL
Lab: ABNORMAL
Lab: NORMAL
NITRITE UR-MCNC: NEGATIVE MG/ML
PH UR: 6 [PH] (ref 5–8)
POC ALBUMIN, URINE: NORMAL MG/L
POC CREATININE, URINE: NORMAL MG/DL
POC URINE ALB/CREA RATIO: NORMAL
PROT UR STRIP-MCNC: NEGATIVE MG/DL
RBC # UR STRIP: NEGATIVE /UL
SP GR UR: 1.02 (ref 1–1.03)
UROBILINOGEN UR QL: ABNORMAL

## 2025-03-12 RX ORDER — METFORMIN HYDROCHLORIDE 500 MG/1
1000 TABLET, EXTENDED RELEASE ORAL 2 TIMES DAILY WITH MEALS
Qty: 360 TABLET | Refills: 1 | Status: SHIPPED | OUTPATIENT
Start: 2025-03-12 | End: 2025-03-17 | Stop reason: SDUPTHER

## 2025-03-12 RX ORDER — HYDROCHLOROTHIAZIDE 12.5 MG/1
CAPSULE ORAL
Qty: 3 EACH | Refills: 11 | Status: SHIPPED | OUTPATIENT
Start: 2025-03-12

## 2025-03-12 RX ORDER — PEN NEEDLE, DIABETIC 32GX 5/32"
NEEDLE, DISPOSABLE MISCELLANEOUS
Qty: 100 EACH | Refills: 3 | Status: SHIPPED | OUTPATIENT
Start: 2025-03-12

## 2025-03-12 RX ORDER — OXYCODONE HYDROCHLORIDE 5 MG/1
5 TABLET ORAL
COMMUNITY
Start: 2024-11-22 | End: 2025-03-12

## 2025-03-12 RX ORDER — ATORVASTATIN CALCIUM 40 MG/1
40 TABLET, FILM COATED ORAL DAILY
Qty: 90 TABLET | Refills: 3 | Status: SHIPPED | OUTPATIENT
Start: 2025-03-12

## 2025-03-12 RX ORDER — KETOROLAC TROMETHAMINE 30 MG/ML
1 INJECTION, SOLUTION INTRAMUSCULAR; INTRAVENOUS ONCE
Qty: 1 EACH | Refills: 0 | Status: SHIPPED | OUTPATIENT
Start: 2025-03-12 | End: 2025-03-12

## 2025-03-12 RX ORDER — AVOBENZONE, HOMOSALATE, OCTISALATE, OCTOCRYLENE 30; 40; 45; 26 MG/ML; MG/ML; MG/ML; MG/ML
CREAM TOPICAL
Qty: 200 EACH | Refills: 3 | Status: SHIPPED | OUTPATIENT
Start: 2025-03-12

## 2025-03-12 RX ORDER — LISINOPRIL 20 MG/1
20 TABLET ORAL DAILY
Qty: 90 TABLET | Refills: 3 | Status: SHIPPED | OUTPATIENT
Start: 2025-03-12

## 2025-03-12 NOTE — PROGRESS NOTES
"Chief Complaint   Patient presents with    Follow-up    Hypertension       Vj Zapata is a pleasant 45 y.o. male who is here for routine follow-up of diabetes type 2, hypertension and hyperlipidemia.  Patient reports that he has run out of most of his medications.  He has been without his insulin for the last 2 days.  Previously was taking 30 units in the morning.  His blood pressure is elevated today.  He denies any symptoms of hypertension.  He does report some blurred vision.  He is currently on his lisinopril 10 mg daily.  His family is present today and they are interpreting.    Past Medical History:   Diagnosis Date    Diabetes mellitus     Heart rate fast     Muscle pain     Type 2 diabetes mellitus     Visual impairment        History reviewed. No pertinent surgical history.    History reviewed. No pertinent family history.    Social History     Socioeconomic History    Marital status:    Tobacco Use    Smoking status: Never    Smokeless tobacco: Never   Vaping Use    Vaping status: Never Used   Substance and Sexual Activity    Alcohol use: Never    Drug use: Never    Sexual activity: Yes     Partners: Female       No Known Allergies    ROS  Review of Systems   Constitutional:  Negative for chills and fever.   Eyes:  Positive for blurred vision.   Respiratory:  Negative for cough.    Cardiovascular:  Negative for chest pain, palpitations and leg swelling.   Endocrine: Negative for polydipsia, polyphagia and polyuria.   Neurological:  Negative for dizziness, light-headedness and headache.       Vitals:    03/12/25 1305   BP: 162/78   Pulse: 80   SpO2: 98%   Weight: 88.9 kg (196 lb)   Height: 177.8 cm (70\")   PainSc: 0-No pain     Body mass index is 28.12 kg/m².      Current Outpatient Medications on File Prior to Visit   Medication Sig Dispense Refill    [DISCONTINUED] atorvastatin (Lipitor) 40 MG tablet Take 1 tablet by mouth Daily. 90 tablet 1    [DISCONTINUED] glucose blood test strip Use to " check glucose 1-2 times a day. 200 each 3    [DISCONTINUED] insulin detemir (LEVEMIR) 100 UNIT/ML injection Inject 30 Units under the skin into the appropriate area as directed Daily.      [DISCONTINUED] Insulin Pen Needle (BD Pen Needle Annabelle 2nd Gen) 32G X 4 MM misc Use dailly to give insulin 100 each 3    [DISCONTINUED] Lancets misc Use 1-2 daily to check blood sugar. 100 each 3    [DISCONTINUED] lisinopril (PRINIVIL,ZESTRIL) 10 MG tablet Take 1 tablet by mouth Daily. 90 tablet 1    [DISCONTINUED] metFORMIN ER (GLUCOPHAGE-XR) 500 MG 24 hr tablet Take 2 tablets by mouth 2 (Two) Times a Day With Meals. 1 tab in AM with breakfast and 1 tab in PM with dinner. 360 tablet 1    [DISCONTINUED] oxyCODONE (ROXICODONE) 5 MG immediate release tablet Take 1 tablet by mouth.       No current facility-administered medications on file prior to visit.       Results for orders placed or performed in visit on 08/11/23   POC Glucose, Blood    Collection Time: 08/11/23  3:32 PM    Specimen: Blood   Result Value Ref Range    Glucose 188 (A) 70 - 130 mg/dL    Lot Number [id number]     Expiration Date 1/28/2024    POC Glycosylated Hemoglobin (Hb A1C)    Collection Time: 08/11/23  3:35 PM    Specimen: Blood   Result Value Ref Range    Hemoglobin A1C 11.9 %    Lot Number [id number]     Expiration Date 4/13/2024        PE    Physical Exam  Vitals reviewed.   Constitutional:       General: He is not in acute distress.     Appearance: Normal appearance. He is well-developed and normal weight. He is not ill-appearing or diaphoretic.   HENT:      Head: Normocephalic and atraumatic.   Eyes:      Extraocular Movements: Extraocular movements intact.      Conjunctiva/sclera: Conjunctivae normal.   Cardiovascular:      Rate and Rhythm: Normal rate and regular rhythm.      Heart sounds: Normal heart sounds.   Pulmonary:      Effort: No respiratory distress.   Musculoskeletal:         General: Normal range of motion.      Cervical back: Normal range of  motion.   Neurological:      General: No focal deficit present.      Mental Status: He is alert.   Psychiatric:         Attention and Perception: He is attentive.         Mood and Affect: Mood normal.         Speech: Speech normal.         Behavior: Behavior normal. Behavior is cooperative.         Thought Content: Thought content normal.         Judgment: Judgment normal.           A/P    Diagnoses and all orders for this visit:    1. Primary hypertension (Primary)  -     lisinopril (PRINIVIL,ZESTRIL) 20 MG tablet; Take 1 tablet by mouth Daily.  Dispense: 90 tablet; Refill: 3  Elevated today.  Will increase lisinopril to 20 mg daily.  Not monitoring at home.  Asymptomatic.    2. Uncontrolled type 2 diabetes mellitus with hyperglycemia  -     POC Glycosylated Hemoglobin (Hb A1C)  -     atorvastatin (Lipitor) 40 MG tablet; Take 1 tablet by mouth Daily.  Dispense: 90 tablet; Refill: 3  -     metFORMIN ER (GLUCOPHAGE-XR) 500 MG 24 hr tablet; Take 2 tablets by mouth 2 (Two) Times a Day With Meals. 1 tab in AM with breakfast and 1 tab in PM with dinner.  Dispense: 360 tablet; Refill: 1  -     glucose blood test strip; Use to check glucose 1-2 times a day.  Dispense: 200 each; Refill: 3  -     Lancets misc; Use 1-2 daily to check blood sugar.  Dispense: 200 each; Refill: 3  -     lisinopril (PRINIVIL,ZESTRIL) 20 MG tablet; Take 1 tablet by mouth Daily.  Dispense: 90 tablet; Refill: 3  -     insulin detemir (LEVEMIR) 100 UNIT/ML injection; Inject 30 Units under the skin into the appropriate area as directed Daily for 30 days.  Dispense: 9 mL; Refill: 2  -     POC Albumin/Creatinine Ratio Urine  -     POC Urinalysis Dipstick, Automated  -     Insulin Pen Needle (BD Pen Needle Annabelle 2nd Gen) 32G X 4 MM misc; Use dailly to give insulin  Dispense: 100 each; Refill: 3  Hemoglobin A1c is 10.5%.  Gave patient a sample of Annmarie 3.  Placed on arm, tolerated well.  Gave him information to download application.  Will send in  prescription to help monitor his glucose values with his insulin.  Reports fasting glucose ranges from 160-200+.  Increase insulin to 40 units daily.  Continue on metformin.  Return in 3 months.  Phone number for endocrinology, Dr. Gibbs, provided to patient.  Recommend he call to make appointment.  Urinalysis does not show any ketones.    3. Mixed hyperlipidemia  -     atorvastatin (Lipitor) 40 MG tablet; Take 1 tablet by mouth Daily.  Dispense: 90 tablet; Refill: 3         Plan of care reviewed with patient at the conclusion of today's visit. Education was provided regarding diagnosis, management and any prescribed or recommended OTC medications.  Patient verbalizes understanding of and agreement with management plan.    Dictated Utilizing Dragon Dictation     Please note that portions of this note were completed with a voice recognition program.     Part of this note may be an electronic transcription/translation of spoken language to printed text using the Dragon Dictation System.    Return in about 3 months (around 6/12/2025) for Annual physical.     Lilliam Cervantes PA-C

## 2025-03-17 DIAGNOSIS — E11.65 UNCONTROLLED TYPE 2 DIABETES MELLITUS WITH HYPERGLYCEMIA: ICD-10-CM

## 2025-03-17 RX ORDER — METFORMIN HYDROCHLORIDE 500 MG/1
1000 TABLET, EXTENDED RELEASE ORAL 2 TIMES DAILY WITH MEALS
Qty: 360 TABLET | Refills: 1 | Status: SHIPPED | OUTPATIENT
Start: 2025-03-17

## 2025-04-29 ENCOUNTER — OFFICE VISIT (OUTPATIENT)
Age: 45
End: 2025-04-29
Payer: COMMERCIAL

## 2025-04-29 VITALS
WEIGHT: 196 LBS | SYSTOLIC BLOOD PRESSURE: 134 MMHG | HEIGHT: 70 IN | OXYGEN SATURATION: 99 % | BODY MASS INDEX: 28.06 KG/M2 | DIASTOLIC BLOOD PRESSURE: 82 MMHG | HEART RATE: 72 BPM

## 2025-04-29 DIAGNOSIS — E11.65 UNCONTROLLED TYPE 2 DIABETES MELLITUS WITH HYPERGLYCEMIA: Primary | ICD-10-CM

## 2025-04-29 LAB
EXPIRATION DATE: ABNORMAL
GLUCOSE BLDC GLUCOMTR-MCNC: 261 MG/DL (ref 70–130)
Lab: ABNORMAL

## 2025-04-29 RX ORDER — ACYCLOVIR 400 MG/1
1 TABLET ORAL
Qty: 9 EACH | Refills: 3 | Status: SHIPPED | OUTPATIENT
Start: 2025-04-29 | End: 2025-07-28

## 2025-04-29 RX ORDER — DAPAGLIFLOZIN 10 MG/1
10 TABLET, FILM COATED ORAL DAILY
Qty: 30 TABLET | Refills: 3 | Status: SHIPPED | OUTPATIENT
Start: 2025-04-29

## 2025-04-29 RX ORDER — PEN NEEDLE, DIABETIC 32GX 5/32"
NEEDLE, DISPOSABLE MISCELLANEOUS
Qty: 100 EACH | Refills: 3 | Status: SHIPPED | OUTPATIENT
Start: 2025-04-29

## 2025-04-29 RX ORDER — ACYCLOVIR 400 MG/1
1 TABLET ORAL
Qty: 1 EACH | Refills: 3 | Status: SHIPPED | OUTPATIENT
Start: 2025-04-29

## 2025-04-29 NOTE — ASSESSMENT & PLAN NOTE
Diabetes is worsening.   Reminded to bring in blood sugar diary at next visit.  Recommended an ADA diet.  Regular aerobic exercise.  Ophthalmology/Optometry referral.  Diabetes educator referral.  Diabetes will be reassessed  6 weeks    Patient to restart long-acting insulin 40 units nightly  Farxiga 10 mg daily  Called pharmacist for verification that Tresiba is covered by insurance, patient did not have his insurance card on file with the pharmacy and was unable to afford insulin earlier this year  Patient has franky  at home but not sensors  Sensors were not covered by insurance pharmacist today, will send in Dexcom today to see if insurance prefers  Metformin, lisinopril, and of atorvastatin were picked up recently by patient      Urged patient to substitute white rice with brown rice going forward  Will refer to both diabetes educator and ophthalmology  Obtain repeat labs in a few months after patient's blood glucose is under better control

## 2025-04-29 NOTE — PROGRESS NOTES
"     Office Note      Date: 2025  Patient Name: Vj Zapata  MRN: [id number]  : 1980    Chief Complaint   Patient presents with    Diabetes       History of Present Illness:   Vj Zapata is a 45 y.o. male who presents for Diabetes type 2. Diagnosed in: 2018. Treated in past with oral agents, insulin. Current treatments: metformin 500 mg xr two tab po bid, hasn't been on insulin in a few months because he could not afford it. Number of insulin shots per day: none. Checks blood sugar none times a day. Has low blood sugar: no. Aspirin use: No -   . Statin use: Yes. ACE-I/ARB use: Yes.     Last eye exam: years ago    Patient accompanied by family member. Visit completed with  today.    Subjective     Review of Systems   Constitutional:  Negative for unexpected weight change.   Endocrine: Negative for polyuria.         Diabetic Complications:  Eyes: No  Kidneys: No  Feet: No  Heart: No    Diet and Exercise:  Meals per day: 3 eating a lot of rice, no soda or desserts  Minutes of exercise per week: 0 mins.      The following portions of the patient's history were reviewed and updated as appropriate: allergies, current medications, past family history, past medical history, past social history, past surgical history, and problem list.    Objective     Visit Vitals  /82 (BP Location: Left arm, Patient Position: Sitting, Cuff Size: Adult)   Pulse 72   Ht 177.8 cm (70\")   Wt 88.9 kg (196 lb)   SpO2 99%   BMI 28.12 kg/m²       Physical Exam:  Physical Exam  Constitutional:       Appearance: Normal appearance. He is normal weight.   HENT:      Head: Normocephalic and atraumatic.      Nose: Nose normal.   Eyes:      Conjunctiva/sclera: Conjunctivae normal.   Cardiovascular:      Rate and Rhythm: Normal rate.      Pulses:           Dorsalis pedis pulses are 2+ on the right side and 2+ on the left side.        Posterior tibial pulses are 2+ on the right side and 2+ on the left side.   Pulmonary: " "     Effort: Pulmonary effort is normal.   Feet:      Right foot:      Protective Sensation: 8 sites tested.  8 sites sensed.      Skin integrity: Skin integrity normal.      Toenail Condition: Right toenails are abnormally thick and long.      Left foot:      Protective Sensation: 8 sites tested.  8 sites sensed.      Skin integrity: Skin integrity normal.      Toenail Condition: Left toenails are abnormally thick and long.   Skin:     General: Skin is warm and dry.   Neurological:      General: No focal deficit present.      Mental Status: He is alert and oriented to person, place, and time. Mental status is at baseline.   Psychiatric:         Mood and Affect: Mood normal.         Behavior: Behavior normal.         Thought Content: Thought content normal.         Judgment: Judgment normal.         Labs:    HbA1c  Hemoglobin A1C   Date Value Ref Range Status   03/12/2025 10.5 (A) 4.5 - 5.7 % Final   11/15/2024 11.8 (H) <5.7 % Final       CMP  11/13/2024    Glucose 363 BUN 10 creatinine 0.8 sodium 134 potassium 3.3 CO2 21 calcium 9.8 total protein 7.4 AST 17 ALT 15 alk phos 132 total bilirubin 0.5     Lipid Panel  Lab Results   Component Value Date    HDL Cholesterol 45 01/06/2023    HDL Cholesterol 42 02/08/2022    LDL Chol Calc (NIH) 107 (H) 01/06/2023    LDL/HDL Ratio 4.36 02/08/2022    Triglycerides 103 01/06/2023    Triglycerides 125 02/08/2022        TSH  Lab Results   Component Value Date    TSH 2.650 01/06/2023        Hemoglobin A1C  No components found for: \"HGBA1C\"     Microalbumin/Creatinine  No results found for: \"MALBCRERATI\"    3/12/25  Point-of-care urine albumin/creatinine ratio between 30 and 300 mg/g  Urine creatinine is 100 mg/dL  Urine BUN was 150 mg/dL      Assessment / Plan      Assessment & Plan:  Diagnoses and all orders for this visit:    1. Uncontrolled type 2 diabetes mellitus with hyperglycemia (Primary)  Assessment & Plan:  Diabetes is worsening.   Reminded to bring in blood sugar " diary at next visit.  Recommended an ADA diet.  Regular aerobic exercise.  Ophthalmology/Optometry referral.  Diabetes educator referral.  Diabetes will be reassessed  6 weeks    Patient to restart long-acting insulin 40 units nightly  Farxiga 10 mg daily  Called pharmacist for verification that Tresiba is covered by insurance, patient did not have his insurance card on file with the pharmacy and was unable to afford insulin earlier this year  Patient has franky  at home but not sensors  Sensors were not covered by insurance pharmacist today, will send in Dexcom today to see if insurance prefers  Metformin, lisinopril, and of atorvastatin were picked up recently by patient      Urged patient to substitute white rice with brown rice going forward  Will refer to both diabetes educator and ophthalmology  Obtain repeat labs in a few months after patient's blood glucose is under better control      Orders:  -     POC Glucose, Blood  -     insulin degludec (TRESIBA FLEXTOUCH) 100 UNIT/ML solution pen-injector injection; Inject 40 Units under the skin into the appropriate area as directed Every Night.  Dispense: 36 mL; Refill: 1  -     Continuous Glucose  (Dexcom G7 ) device; Use 1 each Every 3 (Three) Months.  Dispense: 1 each; Refill: 3  -     Continuous Glucose Sensor (Dexcom G7 Sensor) misc; Use 1 each Every 10 (Ten) Days for 90 days.  Dispense: 9 each; Refill: 3  -     Insulin Pen Needle (BD Pen Needle Annabelle 2nd Gen) 32G X 4 MM misc; Use dailly to give insulin  Dispense: 100 each; Refill: 3  -     Ambulatory Referral to Diabetic Education  -     Ambulatory Referral to Ophthalmology  -     dapagliflozin Propanediol (Farxiga) 10 MG tablet; Take 10 mg by mouth Daily.  Dispense: 30 tablet; Refill: 3       Current Outpatient Medications   Medication Instructions    atorvastatin (LIPITOR) 40 mg, Oral, Daily    Continuous Glucose  (Dexcom G7 ) device 1 each, Not Applicable, Every 3  Months    Continuous Glucose Sensor (Dexcom G7 Sensor) misc 1 each, Not Applicable, Every 10 Days    dapagliflozin Propanediol (FARXIGA) 10 mg, Oral, Daily    glucose blood test strip Use to check glucose 1-2 times a day.    insulin degludec (TRESIBA FLEXTOUCH) 40 Units, Subcutaneous, Nightly    Insulin Pen Needle (BD Pen Needle Annabelle 2nd Gen) 32G X 4 MM misc Use dailly to give insulin    Lancets misc Use 1-2 daily to check blood sugar.    lisinopril (PRINIVIL,ZESTRIL) 20 mg, Oral, Daily    metFORMIN ER (GLUCOPHAGE-XR) 1,000 mg, Oral, 2 Times Daily With Meals      No follow-ups on file.    62 minutes spent during visit with , patient, and patient's family obtaining history, reviewing labs, discussing medications and treatment plan, consulting with patient's pharmacist     Electronically signed by: Carmen Harkins PA-C  04/29/2025

## 2025-05-19 ENCOUNTER — OFFICE VISIT (OUTPATIENT)
Age: 45
End: 2025-05-19
Payer: COMMERCIAL

## 2025-05-19 VITALS
HEART RATE: 82 BPM | SYSTOLIC BLOOD PRESSURE: 158 MMHG | HEIGHT: 70 IN | WEIGHT: 194.5 LBS | BODY MASS INDEX: 27.84 KG/M2 | OXYGEN SATURATION: 99 % | DIASTOLIC BLOOD PRESSURE: 112 MMHG

## 2025-05-19 DIAGNOSIS — E11.65 UNCONTROLLED TYPE 2 DIABETES MELLITUS WITH HYPERGLYCEMIA: Primary | ICD-10-CM

## 2025-05-19 LAB
EXPIRATION DATE: ABNORMAL
GLUCOSE BLDC GLUCOMTR-MCNC: 226 MG/DL (ref 70–130)
Lab: ABNORMAL

## 2025-05-19 RX ORDER — ACYCLOVIR 800 MG/1
TABLET ORAL
COMMUNITY
End: 2025-05-19

## 2025-05-19 RX ORDER — GLIPIZIDE 10 MG/1
10 TABLET, FILM COATED, EXTENDED RELEASE ORAL 2 TIMES DAILY
Qty: 60 TABLET | Refills: 11 | Status: SHIPPED | OUTPATIENT
Start: 2025-05-19 | End: 2026-05-19

## 2025-05-19 RX ORDER — BLOOD-GLUCOSE METER
KIT MISCELLANEOUS
Qty: 400 EACH | Refills: 2 | Status: SHIPPED | OUTPATIENT
Start: 2025-05-19

## 2025-05-19 RX ORDER — ACYCLOVIR 800 MG/1
1 TABLET ORAL
Qty: 6 EACH | Refills: 3 | Status: SHIPPED | OUTPATIENT
Start: 2025-05-19

## 2025-05-19 NOTE — PROGRESS NOTES
"      Office Note      Date: 2025  Patient Name: Vj Zapata  MRN: [id number]  : 1980    Chief Complaint   Patient presents with    Uncontrolled type 2 diabetes mellitus with hyperglycemia       History of Present Illness:   Vj Zapata is a 45 y.o. male who presents for Diabetes type 2. Diagnosed in: 2018. Treated in past with oral agents, insulin. Current treatments: metformin 500 mg xr two tab po bid, Tresiba 30 units daily, although he was prescribed 40 units daily. number of insulin shots per day: none. Checks blood sugar none times a day. Has low blood sugar: no. Aspirin use: No -   . Statin use: Yes. ACE-I/ARB use: Yes.     Patient could not afford Farxiga, so he did not  prescription.  Insurance does not cover franky sensors or Dexcom.  He brought in a freestyle franky , but cannot afford the sensors.  Eating yellow rice but not brown rice.    Last eye exam: years ago    Patient accompanied by family member. Visit completed with  today.    Found to be extremely hypertensive and blood pressure medication bottles he has with them demonstrate lisinopril 10 mg.  In the chart he was supposed to start taking 20 mg in March per PCP prescription.    Subjective     Review of Systems   Constitutional:  Negative for unexpected weight change.   Endocrine: Negative for polyuria.         Diabetic Complications:  Eyes: No  Kidneys: No  Feet: No  Heart: No    Diet and Exercise:  Meals per day: 3 eating a lot of rice, no soda or desserts  Minutes of exercise per week: 0 mins.      The following portions of the patient's history were reviewed and updated as appropriate: allergies, current medications, past family history, past medical history, past social history, past surgical history, and problem list.    Objective     Visit Vitals  BP (!) 158/112 (BP Location: Right arm, Patient Position: Sitting)   Pulse 82   Ht 177.8 cm (70\")   Wt 88.2 kg (194 lb 8 oz)   SpO2 99%   BMI 27.91 " "kg/m²       Physical Exam:  Physical Exam  Constitutional:       Appearance: Normal appearance. He is normal weight.   HENT:      Head: Normocephalic and atraumatic.      Nose: Nose normal.   Eyes:      Conjunctiva/sclera: Conjunctivae normal.   Cardiovascular:      Rate and Rhythm: Normal rate.   Pulmonary:      Effort: Pulmonary effort is normal.   Skin:     General: Skin is warm and dry.   Neurological:      General: No focal deficit present.      Mental Status: He is alert and oriented to person, place, and time. Mental status is at baseline.   Psychiatric:         Mood and Affect: Mood normal.         Behavior: Behavior normal.         Thought Content: Thought content normal.         Judgment: Judgment normal.         Labs:  Glucose in office 5/19/2025-226    HbA1c  Hemoglobin A1C   Date Value Ref Range Status   03/12/2025 10.5 (A) 4.5 - 5.7 % Final   11/15/2024 11.8 (H) <5.7 % Final       CMP  11/13/2024    Glucose 363 BUN 10 creatinine 0.8 sodium 134 potassium 3.3 CO2 21 calcium 9.8 total protein 7.4 AST 17 ALT 15 alk phos 132 total bilirubin 0.5     Lipid Panel  Lab Results   Component Value Date    HDL Cholesterol 45 01/06/2023    HDL Cholesterol 42 02/08/2022    LDL Chol Calc (NIH) 107 (H) 01/06/2023    LDL/HDL Ratio 4.36 02/08/2022    Triglycerides 103 01/06/2023    Triglycerides 125 02/08/2022        TSH  Lab Results   Component Value Date    TSH 2.650 01/06/2023        Hemoglobin A1C  No components found for: \"HGBA1C\"     Microalbumin/Creatinine  No results found for: \"MALBCRERATI\"    3/12/25  Point-of-care urine albumin/creatinine ratio between 30 and 300 mg/g  Urine creatinine is 100 mg/dL  Urine BUN was 150 mg/dL      Assessment / Plan      Assessment & Plan:  Diagnoses and all orders for this visit:    1. Uncontrolled type 2 diabetes mellitus with hyperglycemia (Primary)  Assessment & Plan:  Unknown if diabetes is improving or not, blood glucose in office today still over 200  Patient not " wearing CGM or checking blood glucose at home  Patient has not been to see the eye doctor or scheduled with the diabetes educator  Patient asked to increase insulin to 40 units nightly  We will discontinue Farxiga as patient cannot afford this  Start glipizide 10 mg twice a day to see if this will lower blood glucose readings  It does not look like patient's going to be able to forward CGM  Will call in glucometer test trips and lancets today  Continue metformin 500 mg XR 2 tablets twice a day  Follow-up closely 1 month    Orders:  -     POC Glucose, Blood  -     Blood Glucose Monitoring Suppl w/Device kit; Use 1 each Daily. To test blood glucose as needed  Dispense: 1 each; Refill: 0  -     Lancets Misc. kit; Use 1 each Daily.  Dispense: 90 each; Refill: 3  -     glucose blood (FREESTYLE LITE) test strip; Use 4 times daily as directed ok to change to formulary coverage  Dispense: 400 each; Refill: 2  -     Ambulatory Referral to Diabetic Education    Other orders  -     Continuous Glucose Sensor (FreeStyle Annmarie 3 Sensor) misc; Use 1 each Every 15 (Fifteen) Days.  Dispense: 6 each; Refill: 3  -     glipizide (GLUCOTROL XL) 10 MG 24 hr tablet; Take 1 tablet by mouth 2 (Two) Times a Day.  Dispense: 60 tablet; Refill: 11       Current Outpatient Medications   Medication Instructions    atorvastatin (LIPITOR) 40 mg, Oral, Daily    Blood Glucose Monitoring Suppl w/Device kit 1 each, Not Applicable, Daily, To test blood glucose as needed    Continuous Glucose Sensor (FreeStyle Annmarie 3 Sensor) misc 1 each, Not Applicable, Every 15 Days    glipizide (GLUCOTROL XL) 10 mg, Oral, 2 Times Daily    glucose blood (FREESTYLE LITE) test strip Use 4 times daily as directed ok to change to formulary coverage    glucose blood test strip Use to check glucose 1-2 times a day.    insulin degludec (TRESIBA FLEXTOUCH) 40 Units, Subcutaneous, Nightly    Insulin Pen Needle (BD Pen Needle Annabelle 2nd Gen) 32G X 4 MM misc Use dailly to give  insulin    Lancets Misc. kit 1 each, Not Applicable, Daily    Lancets misc Use 1-2 daily to check blood sugar.    lisinopril (PRINIVIL,ZESTRIL) 20 mg, Oral, Daily    metFORMIN ER (GLUCOPHAGE-XR) 1,000 mg, Oral, 2 Times Daily With Meals      No follow-ups on file.    62 minutes spent during visit with , patient, and patient's family obtaining history, reviewing labs, discussing medications and treatment plan, consulting with patient's pharmacist     Electronically signed by: Carmen Harkins PA-C  05/19/2025

## 2025-05-19 NOTE — ASSESSMENT & PLAN NOTE
Unknown if diabetes is improving or not, blood glucose in office today still over 200  Patient not wearing CGM or checking blood glucose at home  Patient has not been to see the eye doctor or scheduled with the diabetes educator  Patient asked to increase insulin to 40 units nightly  We will discontinue Farxiga as patient cannot afford this  Start glipizide 10 mg twice a day to see if this will lower blood glucose readings  It does not look like patient's going to be able to forward CGM  Will call in glucometer test trips and lancets today  Continue metformin 500 mg XR 2 tablets twice a day  Follow-up closely 1 month

## 2025-05-20 ENCOUNTER — PRIOR AUTHORIZATION (OUTPATIENT)
Dept: ENDOCRINOLOGY | Facility: CLINIC | Age: 45
End: 2025-05-20
Payer: COMMERCIAL

## 2025-05-20 NOTE — TELEPHONE ENCOUNTER
PA for Annmarie 3 plus submitted via [facility].  Vj Zapata (Cruz: [id number])  Rx #: [id number]  Need Help? Call us at [phone]  Outcome  Additional Information Required    This request cannot be processed due to the medication is not covered by the plan.    Drug  FreeStyle Annmarie 3 Plus Sensor  ePA cloud logo  Form  [facility] [employer] Electronic PA Form (2017 [employer])